# Patient Record
Sex: FEMALE | Race: WHITE | NOT HISPANIC OR LATINO
[De-identification: names, ages, dates, MRNs, and addresses within clinical notes are randomized per-mention and may not be internally consistent; named-entity substitution may affect disease eponyms.]

---

## 2017-01-19 ENCOUNTER — APPOINTMENT (OUTPATIENT)
Dept: PULMONOLOGY | Facility: CLINIC | Age: 57
End: 2017-01-19

## 2017-01-19 VITALS
HEIGHT: 67 IN | SYSTOLIC BLOOD PRESSURE: 108 MMHG | DIASTOLIC BLOOD PRESSURE: 68 MMHG | WEIGHT: 156 LBS | HEART RATE: 99 BPM | BODY MASS INDEX: 24.48 KG/M2 | OXYGEN SATURATION: 98 %

## 2017-01-19 DIAGNOSIS — J44.9 CHRONIC OBSTRUCTIVE PULMONARY DISEASE, UNSPECIFIED: ICD-10-CM

## 2017-03-17 ENCOUNTER — RX RENEWAL (OUTPATIENT)
Age: 57
End: 2017-03-17

## 2017-04-14 ENCOUNTER — RX RENEWAL (OUTPATIENT)
Age: 57
End: 2017-04-14

## 2017-04-20 ENCOUNTER — APPOINTMENT (OUTPATIENT)
Dept: PULMONOLOGY | Facility: CLINIC | Age: 57
End: 2017-04-20

## 2017-04-20 VITALS
DIASTOLIC BLOOD PRESSURE: 68 MMHG | HEIGHT: 67 IN | HEART RATE: 107 BPM | WEIGHT: 157 LBS | OXYGEN SATURATION: 98 % | SYSTOLIC BLOOD PRESSURE: 110 MMHG | BODY MASS INDEX: 24.64 KG/M2

## 2017-07-13 ENCOUNTER — RX RENEWAL (OUTPATIENT)
Age: 57
End: 2017-07-13

## 2017-07-20 ENCOUNTER — APPOINTMENT (OUTPATIENT)
Dept: PULMONOLOGY | Facility: CLINIC | Age: 57
End: 2017-07-20

## 2017-07-20 VITALS
DIASTOLIC BLOOD PRESSURE: 78 MMHG | RESPIRATION RATE: 16 BRPM | HEIGHT: 67 IN | BODY MASS INDEX: 25.58 KG/M2 | TEMPERATURE: 98.7 F | HEART RATE: 72 BPM | WEIGHT: 163 LBS | SYSTOLIC BLOOD PRESSURE: 144 MMHG | OXYGEN SATURATION: 99 %

## 2017-07-20 RX ORDER — ALBUTEROL SULFATE 90 UG/1
108 (90 BASE) AEROSOL, METERED RESPIRATORY (INHALATION) EVERY 6 HOURS
Qty: 1 | Refills: 5 | Status: ACTIVE | COMMUNITY
Start: 2017-07-20 | End: 1900-01-01

## 2017-07-24 ENCOUNTER — APPOINTMENT (OUTPATIENT)
Dept: ENDOCRINOLOGY | Facility: CLINIC | Age: 57
End: 2017-07-24

## 2017-07-24 VITALS — DIASTOLIC BLOOD PRESSURE: 80 MMHG | WEIGHT: 162 LBS | SYSTOLIC BLOOD PRESSURE: 132 MMHG | BODY MASS INDEX: 25.37 KG/M2

## 2017-07-24 VITALS — WEIGHT: 163 LBS | OXYGEN SATURATION: 98 % | HEIGHT: 67 IN | BODY MASS INDEX: 25.58 KG/M2 | HEART RATE: 75 BPM

## 2017-07-24 DIAGNOSIS — E55.9 VITAMIN D DEFICIENCY, UNSPECIFIED: ICD-10-CM

## 2017-07-24 DIAGNOSIS — E89.0 POSTPROCEDURAL HYPOTHYROIDISM: ICD-10-CM

## 2020-04-15 ENCOUNTER — EMERGENCY (EMERGENCY)
Facility: HOSPITAL | Age: 60
LOS: 1 days | Discharge: HOME | End: 2020-04-15
Attending: EMERGENCY MEDICINE
Payer: MEDICARE

## 2020-04-15 VITALS
HEIGHT: 66 IN | TEMPERATURE: 99 F | OXYGEN SATURATION: 100 % | WEIGHT: 119.93 LBS | SYSTOLIC BLOOD PRESSURE: 157 MMHG | DIASTOLIC BLOOD PRESSURE: 99 MMHG | HEART RATE: 88 BPM | RESPIRATION RATE: 20 BRPM

## 2020-04-15 VITALS
RESPIRATION RATE: 20 BRPM | TEMPERATURE: 98 F | DIASTOLIC BLOOD PRESSURE: 93 MMHG | SYSTOLIC BLOOD PRESSURE: 142 MMHG | HEART RATE: 90 BPM | OXYGEN SATURATION: 100 %

## 2020-04-15 PROCEDURE — 94640 AIRWAY INHALATION TREATMENT: CPT

## 2020-04-15 PROCEDURE — 99283 EMERGENCY DEPT VISIT LOW MDM: CPT | Mod: 25

## 2020-04-15 PROCEDURE — 71045 X-RAY EXAM CHEST 1 VIEW: CPT

## 2020-04-15 PROCEDURE — 71045 X-RAY EXAM CHEST 1 VIEW: CPT | Mod: 26

## 2020-04-15 PROCEDURE — 99284 EMERGENCY DEPT VISIT MOD MDM: CPT | Mod: GC

## 2020-04-15 RX ORDER — ALBUTEROL 90 UG/1
2 AEROSOL, METERED ORAL EVERY 4 HOURS
Refills: 0 | Status: DISCONTINUED | OUTPATIENT
Start: 2020-04-15 | End: 2020-04-19

## 2020-04-15 RX ORDER — LIDOCAINE 4 G/100G
1 CREAM TOPICAL ONCE
Refills: 0 | Status: COMPLETED | OUTPATIENT
Start: 2020-04-15 | End: 2020-04-15

## 2020-04-15 RX ORDER — TIOTROPIUM BROMIDE 18 UG/1
1 CAPSULE ORAL; RESPIRATORY (INHALATION) DAILY
Refills: 0 | Status: COMPLETED | OUTPATIENT
Start: 2020-04-15 | End: 2021-03-14

## 2020-04-15 RX ORDER — OXYCODONE HYDROCHLORIDE 5 MG/1
5 TABLET ORAL ONCE
Refills: 0 | Status: DISCONTINUED | OUTPATIENT
Start: 2020-04-15 | End: 2020-04-15

## 2020-04-15 RX ORDER — TIOTROPIUM BROMIDE 18 UG/1
1 CAPSULE ORAL; RESPIRATORY (INHALATION) DAILY
Refills: 0 | Status: DISCONTINUED | OUTPATIENT
Start: 2020-04-15 | End: 2020-04-19

## 2020-04-15 RX ORDER — OXYCODONE HYDROCHLORIDE 5 MG/1
1 TABLET ORAL
Qty: 4 | Refills: 0
Start: 2020-04-15 | End: 2020-04-16

## 2020-04-15 RX ORDER — ALBUTEROL 90 UG/1
2 AEROSOL, METERED ORAL EVERY 4 HOURS
Refills: 0 | Status: COMPLETED | OUTPATIENT
Start: 2020-04-15 | End: 2021-03-14

## 2020-04-15 RX ADMIN — OXYCODONE HYDROCHLORIDE 5 MILLIGRAM(S): 5 TABLET ORAL at 14:49

## 2020-04-15 RX ADMIN — Medication 50 MILLIGRAM(S): at 14:08

## 2020-04-15 RX ADMIN — TIOTROPIUM BROMIDE 1 CAPSULE(S): 18 CAPSULE ORAL; RESPIRATORY (INHALATION) at 14:39

## 2020-04-15 RX ADMIN — LIDOCAINE 1 PATCH: 4 CREAM TOPICAL at 14:39

## 2020-04-15 RX ADMIN — ALBUTEROL 2 PUFF(S): 90 AEROSOL, METERED ORAL at 14:08

## 2020-04-15 NOTE — ED PROVIDER NOTE - NSFOLLOWUPINSTRUCTIONS_ED_ALL_ED_FT
Your chest x-ray was significant for a nodule as well as lung tissue that was not seen in your previous chest x-ray done here. The radiology read of your findings are provided in your discharge packet. Please follow up with either Dr. Saeed, your previous pulmonologist or with a new pulmonologist here at our clinic (number provided) for further work up within the 1-2 months to assess for cancer.     Please see attached information on COPD.    Please return to the ED immediately for new cough, fevers, trouble breathing.     Please also see attached information on back pain.     Please follow up with spine center and return immediately for pain.

## 2020-04-15 NOTE — ED PROVIDER NOTE - OBJECTIVE STATEMENT
59F pmhx of COPD, lumbar radiculopathy present for abd pain/back pain/SOB for 1 month. Denies fevers, chills, nausea, vomiting, diarrhea, chest pain. Describes abd pain as generalized, no where specific. Denies changes in sensation to LE, urinary retention/incontinence, Still ambulating. States that symptoms have been worsening during the month, taking Tylenol daily for pain with no improvement leading to presentation today. Patient smokes about 5-6 cigarettes a day.

## 2020-04-15 NOTE — ED ADULT NURSE NOTE - NSIMPLEMENTINTERV_GEN_ALL_ED
Implemented All Universal Safety Interventions:  [place] to call system. Call bell, personal items and telephone within reach. Instruct patient to call for assistance. Room bathroom lighting operational. Non-slip footwear when patient is off stretcher. Physically safe environment: no spills, clutter or unnecessary equipment. Stretcher in lowest position, wheels locked, appropriate side rails in place.

## 2020-04-15 NOTE — ED PROVIDER NOTE - NS ED ROS FT
CONST: no fevers, no chills, no trauma  EYES: no pain, no visual disturbances  ENT: no sore throat, no epistaxis, no rhinorrhea, no hearing changes  CV: no chest pain, no palpitations, no orthopnea, no extremity pain or swelling  RESP: + shortness of breath, no cough, no sputum, no pleurisy, no wheezing  ABD: + abdominal pain, no nausea, no vomiting, no diarrhea, no black or bloody stool  : no dysuria, no hematuria, no frequency, no urgency  MSK: + back pain, no neck pain, no extremity pain  NEURO: no headache, no sensory disturbances, no focal weakness, no dizziness  HEME: no easy bleeding or bruising  SKIN: no diaphoresis, no rash

## 2020-04-15 NOTE — ED PROVIDER NOTE - PHYSICAL EXAMINATION
Const: Well-nourished, Well-developed, appearing stated age.  Eyes: PERRL, no conjunctival injection, and symmetrical lids.  HEENT: Head NCAT, no lesions. Atraumatic external nose and ears. Dry MM.  Neck: Symmetric, trachea midline.   CVS: +S1/S2, Peripheral pulses 2+ and equal in all extremities.  RESP: Mildly labored respiratory effort with pursed lip breathing. +wheezes b/l  GI: Nontender/Nondistended, No hepatosplenomegaly.  MSK: Normocephalic/Atraumatic, Extremities w/o deformity or ttp or edema.   Skin: Warm, dry and intact. No rashes or lesions.  Neuro: CNs II-XII grossly intact. Motor & Sensation intact in b/l LE.   Psych: Awake, Alert, & Oriented (AAO) x3. Appropriate mood and affect.

## 2020-04-15 NOTE — ED PROVIDER NOTE - CLINICAL SUMMARY MEDICAL DECISION MAKING FREE TEXT BOX
59F presenting with worsening back pain abd pain, SOB PE: VSS, mild wheezes b/l Ddx: Back pain likely 2/2 to known radiculopathy. No red flag symptoms and normal neuro exam, not concerning for cauda equina at this time. Does not seem to be in COPD exacerbation, normal O2 sat with only mild wheezes on exam Plan: albuterol, steroids, pain control.

## 2020-04-15 NOTE — ED PROVIDER NOTE - PATIENT PORTAL LINK FT
You can access the FollowMyHealth Patient Portal offered by [facility] by registering at the following website: [email or web address]. By joining [facility]’s FollowMyHealth portal, you will also be able to view your health information using other applications (apps) compatible with our system.

## 2020-04-15 NOTE — ED ADULT NURSE NOTE - OBJECTIVE STATEMENT
59 y.o. Female presents to the ED c/o SOB x1 week. A&Ox3. Hx COPD, HTN, HLD. Pt reports feeling difficulty breathing x1 week. Pt also c/o abd pain with mid and lower back pain x3-4months. Pt states "I can't breathe" and is tachypneic upon assessment with 100% O2 sat. Pt appears anxious. Abd soft and nondistended. As per patient, she has been taking prednisone 10mg last week and tylenol for back pain. Denies CP, N/V/D, urinary complications, fever/chills. Dr. Sheppard at bedside for assessment.

## 2020-04-15 NOTE — ED PROVIDER NOTE - ATTENDING CONTRIBUTION TO CARE
Dr Bobby Note: Dr Bobby Note: 60 yo F smoker, copd, and hx of "back pain"  in ED for back pain  Pt states she ran out of her pain meds  Pt moved out of state now back in [place] and has not established care   Pt states this is her typical lower back pain, non radiating, no numbness, tingling or weakness  No urinary or stool incontinence    Pt has mild SOB, her typical daily cough and sob, still smoking   Pt denies any fevers or chest pain  Pt taking OTC pain meds w no relief, last dose yesterday     Gen: no acute distress non toxic alert and coherent, no cyanosis   HEENT: atraumatic,  no scleral icterus  EOMI   Neck: no midline tenderness, supple  Lungs: no tachypnea, no retractions, mild scattered exp wheeze   CVS: reg HR S1/S2 no murmur no gallop   ABD: soft   Back: no midline tenderness, +b/l lower lumbar paraspinal tenderness   Extremities: No deformities, no edema, no calf tenderness  Neuro: AA and Ox3, CNII-XII grossly intact    Back pain- No neuro symptoms, plan for pain meds, re eval, discussed at length w patient she needs to establish outpatient  follow up, concerning symptoms needs to return for also discussed   Wheeze- pt current smoker, not hypoxic, no resp distress, low suspicion for infection has no fever, normal oxygen saturation for acute resp issue, likely from daily cigarettes and COPD, pt has inhaler at home\  re eval

## 2020-04-15 NOTE — ED PROVIDER NOTE - NSFOLLOWUPCLINICS_GEN_ALL_ED_FT
[facility] Pulmonolgy and Sleep Medicine  Pulmonology  [address] [place] [address]  Phone: [phone]  Fax:   Follow Up Time:

## 2020-04-29 ENCOUNTER — INPATIENT (INPATIENT)
Facility: HOSPITAL | Age: 60
LOS: 4 days | Discharge: HOME | DRG: 381 | End: 2020-05-04
Attending: COLON & RECTAL SURGERY | Admitting: COLON & RECTAL SURGERY
Payer: MEDICARE

## 2020-04-29 VITALS
SYSTOLIC BLOOD PRESSURE: 170 MMHG | RESPIRATION RATE: 40 BRPM | DIASTOLIC BLOOD PRESSURE: 116 MMHG | WEIGHT: 119.93 LBS | HEART RATE: 107 BPM | HEIGHT: 66 IN | TEMPERATURE: 99 F | OXYGEN SATURATION: 96 %

## 2020-04-29 DIAGNOSIS — R19.8 OTHER SPECIFIED SYMPTOMS AND SIGNS INVOLVING THE DIGESTIVE SYSTEM AND ABDOMEN: ICD-10-CM

## 2020-04-29 LAB
ALBUMIN SERPL ELPH-MCNC: 4.2 G/DL — SIGNIFICANT CHANGE UP (ref 3.3–5)
ALP SERPL-CCNC: 107 U/L — SIGNIFICANT CHANGE UP (ref 40–120)
ALT FLD-CCNC: 10 U/L — SIGNIFICANT CHANGE UP (ref 10–45)
ANION GAP SERPL CALC-SCNC: 16 MMOL/L — SIGNIFICANT CHANGE UP (ref 5–17)
APPEARANCE UR: ABNORMAL
AST SERPL-CCNC: 9 U/L — LOW (ref 10–40)
BACTERIA # UR AUTO: NEGATIVE — SIGNIFICANT CHANGE UP
BASE EXCESS BLDV CALC-SCNC: 1.5 MMOL/L — SIGNIFICANT CHANGE UP (ref -2–2)
BASOPHILS # BLD AUTO: 0.01 K/UL — SIGNIFICANT CHANGE UP (ref 0–0.2)
BASOPHILS NFR BLD AUTO: 0.1 % — SIGNIFICANT CHANGE UP (ref 0–2)
BILIRUB SERPL-MCNC: 0.1 MG/DL — LOW (ref 0.2–1.2)
BILIRUB UR-MCNC: NEGATIVE — SIGNIFICANT CHANGE UP
BUN SERPL-MCNC: 33 MG/DL — HIGH (ref 7–23)
CA-I SERPL-SCNC: 1.23 MMOL/L — SIGNIFICANT CHANGE UP (ref 1.12–1.3)
CALCIUM SERPL-MCNC: 9.7 MG/DL — SIGNIFICANT CHANGE UP (ref 8.4–10.5)
CHLORIDE BLDV-SCNC: 103 MMOL/L — SIGNIFICANT CHANGE UP (ref 96–108)
CHLORIDE SERPL-SCNC: 100 MMOL/L — SIGNIFICANT CHANGE UP (ref 96–108)
CO2 BLDV-SCNC: 28 MMOL/L — SIGNIFICANT CHANGE UP (ref 22–30)
CO2 SERPL-SCNC: 23 MMOL/L — SIGNIFICANT CHANGE UP (ref 22–31)
COLOR SPEC: YELLOW — SIGNIFICANT CHANGE UP
CREAT SERPL-MCNC: 1.07 MG/DL — SIGNIFICANT CHANGE UP (ref 0.5–1.3)
DIFF PNL FLD: NEGATIVE — SIGNIFICANT CHANGE UP
EOSINOPHIL # BLD AUTO: 0 K/UL — SIGNIFICANT CHANGE UP (ref 0–0.5)
EOSINOPHIL NFR BLD AUTO: 0 % — SIGNIFICANT CHANGE UP (ref 0–6)
EPI CELLS # UR: 5 /HPF — SIGNIFICANT CHANGE UP
GAS PNL BLDV: 138 MMOL/L — SIGNIFICANT CHANGE UP (ref 135–145)
GAS PNL BLDV: SIGNIFICANT CHANGE UP
GAS PNL BLDV: SIGNIFICANT CHANGE UP
GLUCOSE BLDV-MCNC: 166 MG/DL — HIGH (ref 70–99)
GLUCOSE SERPL-MCNC: 177 MG/DL — HIGH (ref 70–99)
GLUCOSE UR QL: ABNORMAL
HCO3 BLDV-SCNC: 26 MMOL/L — SIGNIFICANT CHANGE UP (ref 21–29)
HCT VFR BLD CALC: 33.7 % — LOW (ref 34.5–45)
HCT VFR BLDA CALC: 35 % — LOW (ref 39–50)
HGB BLD CALC-MCNC: 11.3 G/DL — LOW (ref 11.5–15.5)
HGB BLD-MCNC: 11 G/DL — LOW (ref 11.5–15.5)
HYALINE CASTS # UR AUTO: 5 /LPF — SIGNIFICANT CHANGE UP (ref 0–7)
IMM GRANULOCYTES NFR BLD AUTO: 0.6 % — SIGNIFICANT CHANGE UP (ref 0–1.5)
KETONES UR-MCNC: NEGATIVE — SIGNIFICANT CHANGE UP
LACTATE BLDV-MCNC: 2.3 MMOL/L — HIGH (ref 0.7–2)
LEUKOCYTE ESTERASE UR-ACNC: ABNORMAL
LIDOCAIN IGE QN: 89 U/L — HIGH (ref 7–60)
LYMPHOCYTES # BLD AUTO: 16.9 % — SIGNIFICANT CHANGE UP (ref 13–44)
LYMPHOCYTES # BLD AUTO: 2.24 K/UL — SIGNIFICANT CHANGE UP (ref 1–3.3)
MCHC RBC-ENTMCNC: 29.6 PG — SIGNIFICANT CHANGE UP (ref 27–34)
MCHC RBC-ENTMCNC: 32.6 GM/DL — SIGNIFICANT CHANGE UP (ref 32–36)
MCV RBC AUTO: 90.8 FL — SIGNIFICANT CHANGE UP (ref 80–100)
MONOCYTES # BLD AUTO: 1.17 K/UL — HIGH (ref 0–0.9)
MONOCYTES NFR BLD AUTO: 8.8 % — SIGNIFICANT CHANGE UP (ref 2–14)
NEUTROPHILS # BLD AUTO: 9.75 K/UL — HIGH (ref 1.8–7.4)
NEUTROPHILS NFR BLD AUTO: 73.6 % — SIGNIFICANT CHANGE UP (ref 43–77)
NITRITE UR-MCNC: NEGATIVE — SIGNIFICANT CHANGE UP
NRBC # BLD: 0 /100 WBCS — SIGNIFICANT CHANGE UP (ref 0–0)
PCO2 BLDV: 44 MMHG — SIGNIFICANT CHANGE UP (ref 35–50)
PH BLDV: 7.39 — SIGNIFICANT CHANGE UP (ref 7.35–7.45)
PH UR: 5.5 — SIGNIFICANT CHANGE UP (ref 5–8)
PLATELET # BLD AUTO: 807 K/UL — HIGH (ref 150–400)
PO2 BLDV: 35 MMHG — SIGNIFICANT CHANGE UP (ref 25–45)
POTASSIUM BLDV-SCNC: 3.5 MMOL/L — SIGNIFICANT CHANGE UP (ref 3.5–5.3)
POTASSIUM SERPL-MCNC: 3.5 MMOL/L — SIGNIFICANT CHANGE UP (ref 3.5–5.3)
POTASSIUM SERPL-SCNC: 3.5 MMOL/L — SIGNIFICANT CHANGE UP (ref 3.5–5.3)
PROT SERPL-MCNC: 7.5 G/DL — SIGNIFICANT CHANGE UP (ref 6–8.3)
PROT UR-MCNC: ABNORMAL
RBC # BLD: 3.71 M/UL — LOW (ref 3.8–5.2)
RBC # FLD: 13.2 % — SIGNIFICANT CHANGE UP (ref 10.3–14.5)
RBC CASTS # UR COMP ASSIST: 1 /HPF — SIGNIFICANT CHANGE UP (ref 0–4)
SAO2 % BLDV: 62 % — LOW (ref 67–88)
SARS-COV-2 RNA SPEC QL NAA+PROBE: SIGNIFICANT CHANGE UP
SODIUM SERPL-SCNC: 139 MMOL/L — SIGNIFICANT CHANGE UP (ref 135–145)
SP GR SPEC: 1.03 — HIGH (ref 1.01–1.02)
UROBILINOGEN FLD QL: NEGATIVE — SIGNIFICANT CHANGE UP
WBC # BLD: 13.25 K/UL — HIGH (ref 3.8–10.5)
WBC # FLD AUTO: 13.25 K/UL — HIGH (ref 3.8–10.5)
WBC UR QL: 4 /HPF — SIGNIFICANT CHANGE UP (ref 0–5)

## 2020-04-29 PROCEDURE — 71045 X-RAY EXAM CHEST 1 VIEW: CPT | Mod: 26

## 2020-04-29 PROCEDURE — 99285 EMERGENCY DEPT VISIT HI MDM: CPT | Mod: CS,GC

## 2020-04-29 PROCEDURE — 74177 CT ABD & PELVIS W/CONTRAST: CPT | Mod: 26

## 2020-04-29 RX ORDER — TIOTROPIUM BROMIDE 18 UG/1
1 CAPSULE ORAL; RESPIRATORY (INHALATION) DAILY
Refills: 0 | Status: DISCONTINUED | OUTPATIENT
Start: 2020-04-29 | End: 2020-05-04

## 2020-04-29 RX ORDER — TIOTROPIUM BROMIDE AND OLODATEROL 3.124; 2.736 UG/1; UG/1
2 SPRAY, METERED RESPIRATORY (INHALATION) ONCE
Refills: 0 | Status: COMPLETED | OUTPATIENT
Start: 2020-04-29 | End: 2020-04-29

## 2020-04-29 RX ORDER — ALBUTEROL 90 UG/1
2 AEROSOL, METERED ORAL EVERY 6 HOURS
Refills: 0 | Status: DISCONTINUED | OUTPATIENT
Start: 2020-04-29 | End: 2020-05-04

## 2020-04-29 RX ORDER — FLUCONAZOLE 150 MG/1
TABLET ORAL
Refills: 0 | Status: DISCONTINUED | OUTPATIENT
Start: 2020-04-29 | End: 2020-05-04

## 2020-04-29 RX ORDER — ENOXAPARIN SODIUM 100 MG/ML
40 INJECTION SUBCUTANEOUS ONCE
Refills: 0 | Status: DISCONTINUED | OUTPATIENT
Start: 2020-04-29 | End: 2020-05-01

## 2020-04-29 RX ORDER — SODIUM CHLORIDE 9 MG/ML
1000 INJECTION INTRAMUSCULAR; INTRAVENOUS; SUBCUTANEOUS ONCE
Refills: 0 | Status: COMPLETED | OUTPATIENT
Start: 2020-04-29 | End: 2020-04-29

## 2020-04-29 RX ORDER — BUDESONIDE AND FORMOTEROL FUMARATE DIHYDRATE 160; 4.5 UG/1; UG/1
2 AEROSOL RESPIRATORY (INHALATION)
Refills: 0 | Status: DISCONTINUED | OUTPATIENT
Start: 2020-04-29 | End: 2020-05-04

## 2020-04-29 RX ORDER — MORPHINE SULFATE 50 MG/1
4 CAPSULE, EXTENDED RELEASE ORAL ONCE
Refills: 0 | Status: DISCONTINUED | OUTPATIENT
Start: 2020-04-29 | End: 2020-04-29

## 2020-04-29 RX ORDER — ALBUTEROL 90 UG/1
4 AEROSOL, METERED ORAL ONCE
Refills: 0 | Status: COMPLETED | OUTPATIENT
Start: 2020-04-29 | End: 2020-04-29

## 2020-04-29 RX ORDER — FLUCONAZOLE 150 MG/1
400 TABLET ORAL ONCE
Refills: 0 | Status: COMPLETED | OUTPATIENT
Start: 2020-04-29 | End: 2020-04-29

## 2020-04-29 RX ORDER — PIPERACILLIN AND TAZOBACTAM 4; .5 G/20ML; G/20ML
3.38 INJECTION, POWDER, LYOPHILIZED, FOR SOLUTION INTRAVENOUS ONCE
Refills: 0 | Status: COMPLETED | OUTPATIENT
Start: 2020-04-29 | End: 2020-04-29

## 2020-04-29 RX ORDER — SODIUM CHLORIDE 9 MG/ML
1000 INJECTION INTRAMUSCULAR; INTRAVENOUS; SUBCUTANEOUS
Refills: 0 | Status: DISCONTINUED | OUTPATIENT
Start: 2020-04-29 | End: 2020-04-30

## 2020-04-29 RX ORDER — FLUCONAZOLE 150 MG/1
400 TABLET ORAL EVERY 24 HOURS
Refills: 0 | Status: DISCONTINUED | OUTPATIENT
Start: 2020-04-30 | End: 2020-05-04

## 2020-04-29 RX ADMIN — SODIUM CHLORIDE 1000 MILLILITER(S): 9 INJECTION INTRAMUSCULAR; INTRAVENOUS; SUBCUTANEOUS at 20:07

## 2020-04-29 RX ADMIN — TIOTROPIUM BROMIDE AND OLODATEROL 2 PUFF(S): 3.124; 2.736 SPRAY, METERED RESPIRATORY (INHALATION) at 17:28

## 2020-04-29 RX ADMIN — MORPHINE SULFATE 4 MILLIGRAM(S): 50 CAPSULE, EXTENDED RELEASE ORAL at 23:12

## 2020-04-29 RX ADMIN — PIPERACILLIN AND TAZOBACTAM 200 GRAM(S): 4; .5 INJECTION, POWDER, LYOPHILIZED, FOR SOLUTION INTRAVENOUS at 23:12

## 2020-04-29 RX ADMIN — Medication 125 MILLIGRAM(S): at 16:15

## 2020-04-29 RX ADMIN — ALBUTEROL 4 PUFF(S): 90 AEROSOL, METERED ORAL at 16:15

## 2020-04-29 RX ADMIN — MORPHINE SULFATE 4 MILLIGRAM(S): 50 CAPSULE, EXTENDED RELEASE ORAL at 18:44

## 2020-04-29 RX ADMIN — MORPHINE SULFATE 4 MILLIGRAM(S): 50 CAPSULE, EXTENDED RELEASE ORAL at 15:59

## 2020-04-29 NOTE — ED PROVIDER NOTE - CLINICAL SUMMARY MEDICAL DECISION MAKING FREE TEXT BOX
60F hx of COPD current daily smoker of 5-7 cigarettes presenting for evaluation of shortness of breath, states that she cannot breath, also complaining of severe abdominal pain, states it is a 10/10, on exam without significant findings other than wheezing in all quadrants, will check labs, cxr, ct ap, treat pain with morphine, breathing with COPD cocktail, follow up studies, reassess, dispo.

## 2020-04-29 NOTE — H&P ADULT - NSICDXPASTMEDICALHX_GEN_ALL_CORE_FT
PAST MEDICAL HISTORY:  COPD (chronic obstructive pulmonary disease)     HTN (hypertension)     Hyperthyroidism

## 2020-04-29 NOTE — ED ADULT NURSE REASSESSMENT NOTE - NS ED NURSE REASSESS COMMENT FT1
Report received from GHASSAN Montez. Patient c/o abdominal pain but refusing CT scan. MD Meredith aware. Fluids initiated. Patient pending disposition.

## 2020-04-29 NOTE — ED ADULT TRIAGE NOTE - CHIEF COMPLAINT QUOTE
"i'm end stage COPD" states shortness of breath, abdominal pain, diarrhea - worsening over past few weeks. denies sick contacts or being tested for covid.

## 2020-04-29 NOTE — ED PROVIDER NOTE - ATTENDING CONTRIBUTION TO CARE
60y F hx of COPD, not on home oxygen here c/o SOB, abd pain. Pt states sx have been ongoing for at least last 7 days. SOB constant, no change with position or exertion. No leg swelling. Sat 100% RA. Mild tachypnea and scattered wheezes throughout BL lung fields. No EL edema. No cough or fever. Still smokes 5-7 cigarettes per day. Also notes abd pain, R sided cannot discern whether greater in RUQ or RLQ. No grr. Has diarrhea, watery brown, non bloody for past 3 days. Seen at OSH few days ago for same complaints, still with adhesive in R axilla, states "they did nothing for her." COPD exac vs PNA. Possibly covid. Do not suspect PE. No leg swelling or hypoxia. Check EKG, CXR, albuterol, steroids, re-eval. CTAP to eval for intra-abd infection given abd pain and diarrhea.

## 2020-04-29 NOTE — H&P ADULT - NSHPLABSRESULTS_GEN_ALL_CORE
CBC Full  -  ( 2020 16:20 )  WBC Count : 13.25 K/uL  RBC Count : 3.71 M/uL  Hemoglobin : 11.0 g/dL  Hematocrit : 33.7 %  Platelet Count - Automated : 807 K/uL  Mean Cell Volume : 90.8 fl  Mean Cell Hemoglobin : 29.6 pg  Mean Cell Hemoglobin Concentration : 32.6 gm/dL  Auto Neutrophil # : 9.75 K/uL  Auto Lymphocyte # : 2.24 K/uL  Auto Monocyte # : 1.17 K/uL  Auto Eosinophil # : 0.00 K/uL  Auto Basophil # : 0.01 K/uL  Auto Neutrophil % : 73.6 %  Auto Lymphocyte % : 16.9 %  Auto Monocyte % : 8.8 %  Auto Eosinophil % : 0.0 %  Auto Basophil % : 0.1 %        139  |  100  |  33<H>  ----------------------------<  177<H>  3.5   |  23  |  1.07    Ca    9.7      2020 16:20    TPro  7.5  /  Alb  4.2  /  TBili  0.1<L>  /  DBili  x   /  AST  9<L>  /  ALT  10  /  AlkPhos  107      LIVER FUNCTIONS - ( 2020 16:20 )  Alb: 4.2 g/dL / Pro: 7.5 g/dL / ALK PHOS: 107 U/L / ALT: 10 U/L / AST: 9 U/L / GGT: x           CAPILLARY BLOOD GLUCOSE        Urinalysis Basic - ( 2020 17:18 )    Color: Yellow / Appearance: Slightly Turbid / S.031 / pH: x  Gluc: x / Ketone: Negative  / Bili: Negative / Urobili: Negative   Blood: x / Protein: 30 mg/dL / Nitrite: Negative   Leuk Esterase: Small / RBC: 1 /hpf / WBC 4 /HPF   Sq Epi: x / Non Sq Epi: 5 /hpf / Bacteria: Negative

## 2020-04-29 NOTE — H&P ADULT - ASSESSMENT
60F with COPD on steroids, on longterm mesalamine (likely IBD undiagnosed), current smoker, presents with abdominal pain 'for months' found to have small droplets of air in the wall of the antrum, likely 2/2 ulcer.     -admit to green surgery, Dr. Andrade  -NPO/IVF  -IV abx  -serial abdominal exams before pain medication    page [phone] with surgical questions  Shiela Wright, PGY-4

## 2020-04-29 NOTE — ED PROVIDER NOTE - OBJECTIVE STATEMENT
Hx of COPD, presenting for evaluation of shortness of breath, states that she cannot breath, has been feeling short of breath for the past 7 days. Also complaining of right lower quadrant pain, has been having this and states that it is a 10/10 pain, sharp, no dysuria, no nausea or vomiting, +diarrhea no constipation, denies bloody stools.

## 2020-04-29 NOTE — ED ADULT NURSE REASSESSMENT NOTE - NS ED NURSE REASSESS COMMENT FT1
patient resting on stretcher. patient pending CT scan.  will continue to monitor. patient comfort and safety provided.

## 2020-04-29 NOTE — ED PROVIDER NOTE - PHYSICAL EXAMINATION
Gen: NAD, non-toxic, conversational  Eyes: PERRL, EOMI   HENT: Normocephalic, atraumatic. External ears normal, no rhinorrhea, moist mucous membranes.   CV: RRR, no M/R/G  Resp: wheezing in all lung fields, non-labored, speaking without difficulty on room air with 100% saturation on room air   Abd: soft, non tender, non rigid, no guarding or rebound tenderness  Back: No CVAT bilaterally, no midline ttp  Skin: dry, wwp   Neuro: AOx3, speech is fluent and appropriate  Psych: Mood okay, affect euthymic Gen: NAD, non-toxic, conversational, agitated  Eyes: PERRL, EOMI   HENT: Normocephalic, atraumatic. External ears normal, no rhinorrhea, moist mucous membranes.   CV: RRR, no M/R/G  Resp: wheezing in all lung fields, non-labored, speaking without difficulty on room air with 100% saturation on room air   Abd: soft, non tender, non rigid, no guarding or rebound tenderness  Back: No CVAT bilaterally, no midline ttp  Skin: dry, wwp   Neuro: AOx3, speech is fluent and appropriate  Psych: Mood concerned, affect hyperthymic

## 2020-04-29 NOTE — ED ADULT NURSE NOTE - OBJECTIVE STATEMENT
patient is a 60 year old female PMH of COPD who presents to the ED from home complaining of SOB. Patient states "i'm end stage COPD". patient is also complaining of abdominal pain, diarrhea. patient states she has been experiencing shortness of breath "for weeks" and today it has begun to get worse. patient is aaox3, lungs wheezing bilaterally, abd soft, nondistended, nontender, cap refill <3, radial pulses +2 bilaterally. patient denies chest pain, ha, dizziness, weakness, numbness or tingling, abd pain, back pain, changes in bowel or bladder, hematuria, bloody stool. upon arrival patient is tachypneic, agitated, anxious, patient 92% on room air. patient sinus tachycardia on monitor.  patient comfort and safety provided. VSS. will continue to monitor.

## 2020-04-29 NOTE — ED ADULT NURSE REASSESSMENT NOTE - NS ED NURSE REASSESS COMMENT FT1
RN to pharmacy to send down medication for patient. will continue to monitor. patient comfort and safety provided.

## 2020-04-29 NOTE — H&P ADULT - NSHPPHYSICALEXAM_GEN_ALL_CORE
General: WN/WD NAD  Neurology: A&Ox3, nonfocal, JOE x 4  Head:  Normocephalic, atraumatic  ENT:  Mucosa moist, no ulcerations  Neck:  Supple, no sinuses or palpable masses  Lymphatic:  No palpable cervical, supraclavicular, axillary or inguinal adenopathy  Respiratory: CTA B/L  CV: RRR, S1S2, no murmur  Abdominal: Soft, right upper quadrant and epigastric tenderness, no rebound or guarding, not peritoneal  MSK: No edema, + peripheral pulses, FROM all 4 extremity

## 2020-04-29 NOTE — H&P ADULT - HISTORY OF PRESENT ILLNESS
60F with COPD on steroids, on longterm mesalamine (likely IBD undiagnosed), current smoker, presents with abdominal pain 'for months'. As per patient, she has felt this epigastric and RUQ pain for months, but has been gradually worsening. Pain associated with diarrhea (improved now), but without nausea/vomiting/fevers/ chills. Patient has been able to tolerate a diet and has increased her protein intake lately.  She endorses having three colonoscopies in the past, all normal, most recent a couple of years ago, and has never had an endoscopy. Also says she usually takes prednisone 40mg daily, but ran out of that a few days ago. Currently smokes 5 cigarettes per day.   In the ED, patient tachycardic. Exam with tenderness in the right upper quadrant and epigastrium, no rebound or guarding, not peritoneal. WBC 14, lactate 2.3. CT scan showing two small droplets of air in the wall of the antrum, likely ulcer.

## 2020-04-30 LAB
ANION GAP SERPL CALC-SCNC: 13 MMOL/L — SIGNIFICANT CHANGE UP (ref 5–17)
ANION GAP SERPL CALC-SCNC: 14 MMOL/L — SIGNIFICANT CHANGE UP (ref 5–17)
BUN SERPL-MCNC: 27 MG/DL — HIGH (ref 7–23)
BUN SERPL-MCNC: 28 MG/DL — HIGH (ref 7–23)
CALCIUM SERPL-MCNC: 8.6 MG/DL — SIGNIFICANT CHANGE UP (ref 8.4–10.5)
CALCIUM SERPL-MCNC: 8.9 MG/DL — SIGNIFICANT CHANGE UP (ref 8.4–10.5)
CHLORIDE SERPL-SCNC: 100 MMOL/L — SIGNIFICANT CHANGE UP (ref 96–108)
CHLORIDE SERPL-SCNC: 101 MMOL/L — SIGNIFICANT CHANGE UP (ref 96–108)
CO2 SERPL-SCNC: 22 MMOL/L — SIGNIFICANT CHANGE UP (ref 22–31)
CO2 SERPL-SCNC: 23 MMOL/L — SIGNIFICANT CHANGE UP (ref 22–31)
CREAT SERPL-MCNC: 0.94 MG/DL — SIGNIFICANT CHANGE UP (ref 0.5–1.3)
CREAT SERPL-MCNC: 1.02 MG/DL — SIGNIFICANT CHANGE UP (ref 0.5–1.3)
GLUCOSE SERPL-MCNC: 140 MG/DL — HIGH (ref 70–99)
GLUCOSE SERPL-MCNC: 198 MG/DL — HIGH (ref 70–99)
H PYLORI AB SER-ACNC: <5 UNITS — SIGNIFICANT CHANGE UP
HCT VFR BLD CALC: 30.7 % — LOW (ref 34.5–45)
HCT VFR BLD CALC: 31.3 % — LOW (ref 34.5–45)
HCV AB S/CO SERPL IA: 5.02 S/CO — HIGH (ref 0–0.99)
HCV AB SERPL-IMP: REACTIVE
HGB BLD-MCNC: 9.7 G/DL — LOW (ref 11.5–15.5)
HGB BLD-MCNC: 9.7 G/DL — LOW (ref 11.5–15.5)
LACTATE SERPL-SCNC: 1.2 MMOL/L — SIGNIFICANT CHANGE UP (ref 0.7–2)
MAGNESIUM SERPL-MCNC: 2.5 MG/DL — SIGNIFICANT CHANGE UP (ref 1.6–2.6)
MAGNESIUM SERPL-MCNC: 2.5 MG/DL — SIGNIFICANT CHANGE UP (ref 1.6–2.6)
MCHC RBC-ENTMCNC: 29.6 PG — SIGNIFICANT CHANGE UP (ref 27–34)
MCHC RBC-ENTMCNC: 29.7 PG — SIGNIFICANT CHANGE UP (ref 27–34)
MCHC RBC-ENTMCNC: 31 GM/DL — LOW (ref 32–36)
MCHC RBC-ENTMCNC: 31.6 GM/DL — LOW (ref 32–36)
MCV RBC AUTO: 93.9 FL — SIGNIFICANT CHANGE UP (ref 80–100)
MCV RBC AUTO: 95.4 FL — SIGNIFICANT CHANGE UP (ref 80–100)
NRBC # BLD: 0 /100 WBCS — SIGNIFICANT CHANGE UP (ref 0–0)
NRBC # BLD: 0 /100 WBCS — SIGNIFICANT CHANGE UP (ref 0–0)
PHOSPHATE SERPL-MCNC: 2.8 MG/DL — SIGNIFICANT CHANGE UP (ref 2.5–4.5)
PHOSPHATE SERPL-MCNC: 3.3 MG/DL — SIGNIFICANT CHANGE UP (ref 2.5–4.5)
PLATELET # BLD AUTO: 684 K/UL — HIGH (ref 150–400)
PLATELET # BLD AUTO: 711 K/UL — HIGH (ref 150–400)
POTASSIUM SERPL-MCNC: 4.3 MMOL/L — SIGNIFICANT CHANGE UP (ref 3.5–5.3)
POTASSIUM SERPL-MCNC: 4.6 MMOL/L — SIGNIFICANT CHANGE UP (ref 3.5–5.3)
POTASSIUM SERPL-SCNC: 4.3 MMOL/L — SIGNIFICANT CHANGE UP (ref 3.5–5.3)
POTASSIUM SERPL-SCNC: 4.6 MMOL/L — SIGNIFICANT CHANGE UP (ref 3.5–5.3)
RBC # BLD: 3.27 M/UL — LOW (ref 3.8–5.2)
RBC # BLD: 3.28 M/UL — LOW (ref 3.8–5.2)
RBC # FLD: 13.3 % — SIGNIFICANT CHANGE UP (ref 10.3–14.5)
RBC # FLD: 13.3 % — SIGNIFICANT CHANGE UP (ref 10.3–14.5)
SODIUM SERPL-SCNC: 136 MMOL/L — SIGNIFICANT CHANGE UP (ref 135–145)
SODIUM SERPL-SCNC: 137 MMOL/L — SIGNIFICANT CHANGE UP (ref 135–145)
WBC # BLD: 7.98 K/UL — SIGNIFICANT CHANGE UP (ref 3.8–10.5)
WBC # BLD: 9.88 K/UL — SIGNIFICANT CHANGE UP (ref 3.8–10.5)
WBC # FLD AUTO: 7.98 K/UL — SIGNIFICANT CHANGE UP (ref 3.8–10.5)
WBC # FLD AUTO: 9.88 K/UL — SIGNIFICANT CHANGE UP (ref 3.8–10.5)

## 2020-04-30 RX ORDER — SUCRALFATE 1 G
1 TABLET ORAL EVERY 6 HOURS
Refills: 0 | Status: DISCONTINUED | OUTPATIENT
Start: 2020-04-30 | End: 2020-05-01

## 2020-04-30 RX ORDER — HYDROMORPHONE HYDROCHLORIDE 2 MG/ML
0.25 INJECTION INTRAMUSCULAR; INTRAVENOUS; SUBCUTANEOUS ONCE
Refills: 0 | Status: DISCONTINUED | OUTPATIENT
Start: 2020-04-30 | End: 2020-04-30

## 2020-04-30 RX ORDER — PANTOPRAZOLE SODIUM 20 MG/1
40 TABLET, DELAYED RELEASE ORAL
Refills: 0 | Status: DISCONTINUED | OUTPATIENT
Start: 2020-04-30 | End: 2020-05-01

## 2020-04-30 RX ORDER — SODIUM CHLORIDE 9 MG/ML
1000 INJECTION, SOLUTION INTRAVENOUS
Refills: 0 | Status: DISCONTINUED | OUTPATIENT
Start: 2020-04-30 | End: 2020-05-02

## 2020-04-30 RX ORDER — PIPERACILLIN AND TAZOBACTAM 4; .5 G/20ML; G/20ML
3.38 INJECTION, POWDER, LYOPHILIZED, FOR SOLUTION INTRAVENOUS EVERY 8 HOURS
Refills: 0 | Status: DISCONTINUED | OUTPATIENT
Start: 2020-04-30 | End: 2020-05-04

## 2020-04-30 RX ORDER — PANTOPRAZOLE SODIUM 20 MG/1
40 TABLET, DELAYED RELEASE ORAL DAILY
Refills: 0 | Status: DISCONTINUED | OUTPATIENT
Start: 2020-04-30 | End: 2020-04-30

## 2020-04-30 RX ORDER — MORPHINE SULFATE 50 MG/1
4 CAPSULE, EXTENDED RELEASE ORAL ONCE
Refills: 0 | Status: DISCONTINUED | OUTPATIENT
Start: 2020-04-30 | End: 2020-04-30

## 2020-04-30 RX ORDER — ACETAMINOPHEN 500 MG
1000 TABLET ORAL EVERY 6 HOURS
Refills: 0 | Status: COMPLETED | OUTPATIENT
Start: 2020-04-30 | End: 2020-05-01

## 2020-04-30 RX ADMIN — HYDROMORPHONE HYDROCHLORIDE 0.25 MILLIGRAM(S): 2 INJECTION INTRAMUSCULAR; INTRAVENOUS; SUBCUTANEOUS at 10:43

## 2020-04-30 RX ADMIN — FLUCONAZOLE 100 MILLIGRAM(S): 150 TABLET ORAL at 00:22

## 2020-04-30 RX ADMIN — Medication 1 GRAM(S): at 21:04

## 2020-04-30 RX ADMIN — ALBUTEROL 2 PUFF(S): 90 AEROSOL, METERED ORAL at 21:36

## 2020-04-30 RX ADMIN — HYDROMORPHONE HYDROCHLORIDE 0.25 MILLIGRAM(S): 2 INJECTION INTRAMUSCULAR; INTRAVENOUS; SUBCUTANEOUS at 15:42

## 2020-04-30 RX ADMIN — PANTOPRAZOLE SODIUM 40 MILLIGRAM(S): 20 TABLET, DELAYED RELEASE ORAL at 17:10

## 2020-04-30 RX ADMIN — Medication 400 MILLIGRAM(S): at 00:39

## 2020-04-30 RX ADMIN — MORPHINE SULFATE 4 MILLIGRAM(S): 50 CAPSULE, EXTENDED RELEASE ORAL at 02:50

## 2020-04-30 RX ADMIN — PIPERACILLIN AND TAZOBACTAM 25 GRAM(S): 4; .5 INJECTION, POWDER, LYOPHILIZED, FOR SOLUTION INTRAVENOUS at 06:32

## 2020-04-30 RX ADMIN — SODIUM CHLORIDE 75 MILLILITER(S): 9 INJECTION, SOLUTION INTRAVENOUS at 17:16

## 2020-04-30 RX ADMIN — BUDESONIDE AND FORMOTEROL FUMARATE DIHYDRATE 2 PUFF(S): 160; 4.5 AEROSOL RESPIRATORY (INHALATION) at 17:10

## 2020-04-30 RX ADMIN — SODIUM CHLORIDE 100 MILLILITER(S): 9 INJECTION INTRAMUSCULAR; INTRAVENOUS; SUBCUTANEOUS at 02:25

## 2020-04-30 RX ADMIN — PIPERACILLIN AND TAZOBACTAM 25 GRAM(S): 4; .5 INJECTION, POWDER, LYOPHILIZED, FOR SOLUTION INTRAVENOUS at 13:42

## 2020-04-30 RX ADMIN — BUDESONIDE AND FORMOTEROL FUMARATE DIHYDRATE 2 PUFF(S): 160; 4.5 AEROSOL RESPIRATORY (INHALATION) at 05:02

## 2020-04-30 RX ADMIN — TIOTROPIUM BROMIDE 1 CAPSULE(S): 18 CAPSULE ORAL; RESPIRATORY (INHALATION) at 13:42

## 2020-04-30 RX ADMIN — Medication 101.6 MILLIGRAM(S): at 05:02

## 2020-04-30 RX ADMIN — PIPERACILLIN AND TAZOBACTAM 25 GRAM(S): 4; .5 INJECTION, POWDER, LYOPHILIZED, FOR SOLUTION INTRAVENOUS at 21:04

## 2020-04-30 RX ADMIN — PANTOPRAZOLE SODIUM 40 MILLIGRAM(S): 20 TABLET, DELAYED RELEASE ORAL at 00:23

## 2020-04-30 NOTE — PROVIDER CONTACT NOTE (OTHER) - BACKGROUND
60 y F, c/o abdominal pain.  CTA shows small perforation in ABD. HX of HTN, COPD.  Pt was given morphine 4mg IVP x2. 60 y F, c/o abdominal pain.  CTA shows small perforation in ABD. HX of HTN, COPD.  Pt was given morphine 4mg IVP x2 in ED.

## 2020-04-30 NOTE — PROGRESS NOTE ADULT - SUBJECTIVE AND OBJECTIVE BOX
Interval events: Admitted with gastric ulcer and gastritis, imaging notable for focus of air within wall of gastric antrum. WBC improved from 13.2 to 9.8, and lactate from 2.3 to 1.2 with 1L fluid resuscitation. Re-examined overnight; continues to c/o abdominal pain and tender on exam, though soft, no peritonitis.    S: Patient continues to c/o epigastric abdominal pain, poorly controlled with pain medications. Remains NPO with no nausea/vomiting.    O: Vital Signs  T(C): 36.9 (04-29 @ 20:10), Max: 37.1 (04-29 @ 14:51)  HR: 78 (04-29 @ 23:10) (77 - 107)  BP: 166/97 (04-29 @ 23:10) (140/91 - 170/116)  RR: 20 (04-29 @ 23:10) (20 - 40)  SpO2: 99% (04-29 @ 23:10) (96% - 100%)    General: alert and oriented, NAD  Resp: airway patent, respirations unlabored  CVS: regular rate and rhythm  Abdomen: soft, +TTP in epigastrium, nondistended, no rebound/guarding  Extremities: no edema  Skin: warm, dry, appropriate color                          9.7    9.88  )-----------( 684      ( 30 Apr 2020 00:13 )             30.7   04-30    136  |  100  |  27<H>  ----------------------------<  198<H>  4.6   |  23  |  0.94    Ca    8.6      30 Apr 2020 00:13  Phos  2.8     04-30  Mg     2.5     04-30    TPro  7.5  /  Alb  4.2  /  TBili  0.1<L>  /  DBili  x   /  AST  9<L>  /  ALT  10  /  AlkPhos  107  04-29

## 2020-04-30 NOTE — PROVIDER CONTACT NOTE (OTHER) - RECOMMENDATIONS
Pt refusing to fill out form or go for MRI. Pt states she is not here for that problem and does not want to go. Pt educated about fx-still refusing

## 2020-04-30 NOTE — CHART NOTE - NSCHARTNOTEFT_GEN_A_CORE
Interval Events:  - C/o severe abdominal pain. Describes as similar to AM pain episodes.  - Refusing IV tylenol, states makes pain worse    O: Vital Signs  T(C): 36.5 (04-30 @ 09:48), Max: 37.1 (04-29 @ 16:26)  HR: 77 (04-30 @ 09:48) (62 - 100)  BP: 164/96 (04-30 @ 09:48) (140/91 - 166/97)  RR: 18 (04-30 @ 09:48) (18 - 25)  SpO2: 96% (04-30 @ 09:48) (96% - 100%)    Physical Exam:  General: NAD  Resp: respirations unlabored  Abdomen: soft, epigastric fullness and tenderness to palpation                          9.7    7.98  )-----------( 711      ( 30 Apr 2020 06:23 )             31.3   04-30    137  |  101  |  28<H>  ----------------------------<  140<H>  4.3   |  22  |  1.02    Ca    8.9      30 Apr 2020 06:23  Phos  3.3     04-30  Mg     2.5     04-30    TPro  7.5  /  Alb  4.2  /  TBili  0.1<L>  /  DBili  x   /  AST  9<L>  /  ALT  10  /  AlkPhos  107  04-29      Plan:  - 0.25mg IV dilaudid now

## 2020-04-30 NOTE — PROVIDER CONTACT NOTE (OTHER) - ASSESSMENT
Pt A&Ox4, VSS.  No signs of distress.  Pt c/o abdominal pain 9/10.  Pt remains NPO.  Requesting pain meds at this time.

## 2020-04-30 NOTE — PROGRESS NOTE ADULT - ASSESSMENT
60F with COPD on steroids, on longterm mesalamine (likely IBD undiagnosed), current smoker, presents with gastric ulcer with imaging concerning for small focus of air in wall of gastric antrum. Leukocytosis and lactate improved with fluid resuscitation. Remains hemodynamically stable with stable abdominal exam.    - C/w strict NPO, IVF  - IV antibiotics  - IV Protonix 40 daily  - Continue home steroids (takes prednisone 40 daily at home), inhalers  - F/U AM labs  - DVT ppx: lovenox    [facility] [id number] 60F with COPD on steroids, on longterm mesalamine (likely IBD undiagnosed), current smoker, presents with gastric ulcer with imaging concerning for small focus of air in wall of gastric antrum. Leukocytosis and lactate improved with fluid resuscitation. Remains hemodynamically stable with stable abdominal exam.    - C/w strict NPO, IVF  - IV antibiotics  - Increase IV Protonix 40 to BID  - Continue home steroids (takes prednisone 40 daily at home), inhalers  - F/U AM labs  - DVT ppx: lovenox    [facility] [id number] 60F with COPD on steroids, on longterm mesalamine (likely IBD undiagnosed), current smoker, presents with gastric ulcer with imaging concerning for small focus of air in wall of gastric antrum. Leukocytosis and lactate improved with fluid resuscitation. Remains hemodynamically stable with stable abdominal exam.    - Plan for upper GI Friday   - MRI spine for new compression fracture --> spine cs pending results  - C/w strict NPO, IVF  - IV antibiotics  - Increase IV Protonix 40 to BID  - Continue home steroids (takes prednisone 40 daily at home), inhalers  - F/u AM labs  - F/u H pylori serology  - DVT ppx: lovenox    [facility] [id number] 60F with COPD on steroids, on longterm mesalamine (likely IBD undiagnosed), current smoker, presents with gastric ulcer with imaging concerning for small focus of air in wall of gastric antrum. Leukocytosis and lactate improved with fluid resuscitation. Remains hemodynamically stable with stable abdominal exam.      - MRI spine for new compression fracture --> spine cs pending results  - C/w strict NPO, IVF  - IV antibiotics  - Increase IV Protonix 40 to BID  - Continue home steroids (takes prednisone 40 daily at home), inhalers  - F/u AM labs  - F/u H pylori serology  - DVT ppx: lovenox    [facility] [id number] 60F with COPD on steroids, on longterm mesalamine (likely IBD undiagnosed), current smoker, presents with gastric ulcer with imaging concerning for small focus of air in wall of gastric antrum. Leukocytosis and lactate improved with fluid resuscitation. Remains hemodynamically stable with stable abdominal exam.    Plan  - Upper GI series Friday  - MRI spine for new compression fracture --> spine cs pending results  - C/w strict NPO, IVF  - IV antibiotics  - Increase IV Protonix 40 to BID  - Continue home steroids (takes prednisone 40 daily at home), inhalers  - F/u AM labs  - F/u H pylori serology  - DVT ppx: lovenox    [facility] [id number]

## 2020-04-30 NOTE — CHART NOTE - NSCHARTNOTEFT_GEN_A_CORE
Interval Events:  - Patient c/o severe abdominal pain. States pain feels the same as this morning on AM rounds. Patient refused IV tylenol for pain.      O: Vital Signs  T(C): 36.5 (04-30 @ 09:48), Max: 37.1 (04-29 @ 14:51)  HR: 77 (04-30 @ 09:48) (62 - 107)  BP: 164/96 (04-30 @ 09:48) (140/91 - 170/116)  RR: 18 (04-30 @ 09:48) (18 - 40)  SpO2: 96% (04-30 @ 09:48) (96% - 100%)    Physical Exam:  General: NAD  Resp: respirations unlabored  CVS: regular rate and rhythm  Abdomen: soft, epigastric fullness and tenderness to palpation                          9.7    7.98  )-----------( 711      ( 30 Apr 2020 06:23 )             31.3   04-30    137  |  101  |  28<H>  ----------------------------<  140<H>  4.3   |  22  |  1.02    Ca    8.9      30 Apr 2020 06:23  Phos  3.3     04-30  Mg     2.5     04-30    TPro  7.5  /  Alb  4.2  /  TBili  0.1<L>  /  DBili  x   /  AST  9<L>  /  ALT  10  /  AlkPhos  107  04-29      Plan:  - Multimodal pain management with IV tylenol and narcotics  - IV tylenol now  - May have morphine if no response to IV tylenol    [facility]  [id number]

## 2020-04-30 NOTE — CHART NOTE - NSCHARTNOTEFT_GEN_A_CORE
GENERAL SURGERY NOTE:    Interval:  Paged for abd pain    Subjective:  Patient seen and examined. Reports abd pain. Denies routine use of pain meds    Exam:  Vital Signs Last 24 Hrs  T(C): 36.9 (2020 20:10), Max: 37.1 (2020 14:51)  T(F): 98.4 (2020 20:10), Max: 98.8 (2020 14:51)  HR: 78 (2020 23:10) (77 - 107)  BP: 166/97 (2020 23:10) (140/91 - 170/116)  BP(mean): --  RR: 20 (2020 23:10) (20 - 40)  SpO2: 99% (2020 23:10) (96% - 100%)    I&O's Detail      Daily Height in cm: 167.64 (2020 14:51)    Daily     MEDICATIONS  (STANDING):  acetaminophen  IVPB .. 1000 milliGRAM(s) IV Intermittent every 6 hours  budesonide  80 MICROgram(s)/formoterol 4.5 MICROgram(s) Inhaler 2 Puff(s) Inhalation two times a day  enoxaparin Injectable 40 milliGRAM(s) SubCutaneous once  fluconAZOLE IVPB 400 milliGRAM(s) IV Intermittent every 24 hours  fluconAZOLE IVPB      methylPREDNISolone sodium succinate IVPB 32 milliGRAM(s) IV Intermittent daily  morphine  - Injectable 4 milliGRAM(s) IV Push Once  pantoprazole  Injectable 40 milliGRAM(s) IV Push daily  sodium chloride 0.9%. 1000 milliLiter(s) (100 mL/Hr) IV Continuous <Continuous>  tiotropium 18 MICROgram(s) Capsule 1 Capsule(s) Inhalation daily    MEDICATIONS  (PRN):  ALBUTerol    90 MICROgram(s) HFA Inhaler 2 Puff(s) Inhalation every 6 hours PRN Shortness of Breath and/or Wheezing      Gen: NAD, resting in bed, alert and responding appropriately  Resp: Airway patent, non-labored respirations  Abd: Soft, ND, moderate TTP UQs, no rebound or guarding.   Ext: No edema, WWP  Neuro: AAOx3, no focal deficits    LABS:                        11.0   13.25 )-----------( 807      ( 2020 16:20 )             33.7         139  |  100  |  33<H>  ----------------------------<  177<H>  3.5   |  23  |  1.07    Ca    9.7      2020 16:20    TPro  7.5  /  Alb  4.2  /  TBili  0.1<L>  /  DBili  x   /  AST  9<L>  /  ALT  10  /  AlkPhos  107  04-      Urinalysis Basic - ( 2020 17:18 )    Color: Yellow / Appearance: Slightly Turbid / S.031 / pH: x  Gluc: x / Ketone: Negative  / Bili: Negative / Urobili: Negative   Blood: x / Protein: 30 mg/dL / Nitrite: Negative   Leuk Esterase: Small / RBC: 1 /hpf / WBC 4 /HPF   Sq Epi: x / Non Sq Epi: 5 /hpf / Bacteria: Negative    60F pw gastric ulcer    Plan:   - 4 morphine  - Standing IV tylenol    NILSON Woods, PGY-1  [facility]  [id number] with any questions

## 2020-04-30 NOTE — PROVIDER CONTACT NOTE (OTHER) - ASSESSMENT
Pt resting in bed, no signs of distress, a&ox3, asked by RN to fill out MRI form for scheduled MRI at 11am

## 2020-04-30 NOTE — ED ADULT NURSE REASSESSMENT NOTE - NS ED NURSE REASSESS COMMENT FT1
Patient received bed assignment on 3tower. Patient aware of assignment. Report given to receiving RN Faiz. VSS. Patient stable for transport. Chart given to charge desk. Safety and comfort maintained while in ED.

## 2020-04-30 NOTE — CHART NOTE - NSCHARTNOTEFT_GEN_A_CORE
Patient ordered for MRI thoracic and lumbar spine to evaluate new moderate to severe age-indeterminate compression fracture deformities of T10-L5 found on CT. Patient has no new back pain or focal neurologic deficits. She states that she is aware of the fractures and these were evaluated by an MD in [place]. She received treatment with "pills" and that made the pain better. She refuses any further evaluation of spine findings at this time.

## 2020-05-01 DIAGNOSIS — F05 DELIRIUM DUE TO KNOWN PHYSIOLOGICAL CONDITION: ICD-10-CM

## 2020-05-01 LAB
ANION GAP SERPL CALC-SCNC: 13 MMOL/L — SIGNIFICANT CHANGE UP (ref 5–17)
APTT BLD: 24.8 SEC — LOW (ref 27.5–36.3)
BLD GP AB SCN SERPL QL: NEGATIVE — SIGNIFICANT CHANGE UP
BUN SERPL-MCNC: 24 MG/DL — HIGH (ref 7–23)
CALCIUM SERPL-MCNC: 9.3 MG/DL — SIGNIFICANT CHANGE UP (ref 8.4–10.5)
CHLORIDE SERPL-SCNC: 98 MMOL/L — SIGNIFICANT CHANGE UP (ref 96–108)
CO2 SERPL-SCNC: 23 MMOL/L — SIGNIFICANT CHANGE UP (ref 22–31)
CREAT SERPL-MCNC: 1.08 MG/DL — SIGNIFICANT CHANGE UP (ref 0.5–1.3)
GLUCOSE SERPL-MCNC: 121 MG/DL — HIGH (ref 70–99)
HCT VFR BLD CALC: 31 % — LOW (ref 34.5–45)
HGB BLD-MCNC: 9.6 G/DL — LOW (ref 11.5–15.5)
INR BLD: 0.86 RATIO — LOW (ref 0.88–1.16)
MAGNESIUM SERPL-MCNC: 2.4 MG/DL — SIGNIFICANT CHANGE UP (ref 1.6–2.6)
MCHC RBC-ENTMCNC: 29.3 PG — SIGNIFICANT CHANGE UP (ref 27–34)
MCHC RBC-ENTMCNC: 31 GM/DL — LOW (ref 32–36)
MCV RBC AUTO: 94.5 FL — SIGNIFICANT CHANGE UP (ref 80–100)
NRBC # BLD: 0 /100 WBCS — SIGNIFICANT CHANGE UP (ref 0–0)
PHOSPHATE SERPL-MCNC: 2.5 MG/DL — SIGNIFICANT CHANGE UP (ref 2.5–4.5)
PLATELET # BLD AUTO: 651 K/UL — HIGH (ref 150–400)
POTASSIUM SERPL-MCNC: 3.6 MMOL/L — SIGNIFICANT CHANGE UP (ref 3.5–5.3)
POTASSIUM SERPL-SCNC: 3.6 MMOL/L — SIGNIFICANT CHANGE UP (ref 3.5–5.3)
PROTHROM AB SERPL-ACNC: 9.9 SEC — LOW (ref 10–13.1)
RBC # BLD: 3.28 M/UL — LOW (ref 3.8–5.2)
RBC # FLD: 13.2 % — SIGNIFICANT CHANGE UP (ref 10.3–14.5)
RH IG SCN BLD-IMP: POSITIVE — SIGNIFICANT CHANGE UP
SODIUM SERPL-SCNC: 134 MMOL/L — LOW (ref 135–145)
WBC # BLD: 7.81 K/UL — SIGNIFICANT CHANGE UP (ref 3.8–10.5)
WBC # FLD AUTO: 7.81 K/UL — SIGNIFICANT CHANGE UP (ref 3.8–10.5)

## 2020-05-01 PROCEDURE — 99222 1ST HOSP IP/OBS MODERATE 55: CPT | Mod: GC

## 2020-05-01 PROCEDURE — 74240 X-RAY XM UPR GI TRC 1CNTRST: CPT | Mod: 26

## 2020-05-01 PROCEDURE — 99232 SBSQ HOSP IP/OBS MODERATE 35: CPT | Mod: GC

## 2020-05-01 RX ORDER — LOSARTAN POTASSIUM 100 MG/1
25 TABLET, FILM COATED ORAL DAILY
Refills: 0 | Status: DISCONTINUED | OUTPATIENT
Start: 2020-05-01 | End: 2020-05-04

## 2020-05-01 RX ORDER — ACETAMINOPHEN 500 MG
650 TABLET ORAL EVERY 6 HOURS
Refills: 0 | Status: COMPLETED | OUTPATIENT
Start: 2020-05-01 | End: 2020-05-04

## 2020-05-01 RX ORDER — POTASSIUM PHOSPHATE, MONOBASIC POTASSIUM PHOSPHATE, DIBASIC 236; 224 MG/ML; MG/ML
15 INJECTION, SOLUTION INTRAVENOUS ONCE
Refills: 0 | Status: DISCONTINUED | OUTPATIENT
Start: 2020-05-01 | End: 2020-05-01

## 2020-05-01 RX ORDER — ENOXAPARIN SODIUM 100 MG/ML
40 INJECTION SUBCUTANEOUS ONCE
Refills: 0 | Status: DISCONTINUED | OUTPATIENT
Start: 2020-05-01 | End: 2020-05-01

## 2020-05-01 RX ORDER — ENOXAPARIN SODIUM 100 MG/ML
40 INJECTION SUBCUTANEOUS DAILY
Refills: 0 | Status: DISCONTINUED | OUTPATIENT
Start: 2020-05-01 | End: 2020-05-04

## 2020-05-01 RX ORDER — FOLIC ACID/VIT B COMPLEX AND C 400 MCG
20000 TABLET ORAL DAILY
Refills: 0 | Status: DISCONTINUED | OUTPATIENT
Start: 2020-05-01 | End: 2020-05-04

## 2020-05-01 RX ORDER — FOLIC ACID/VIT B COMPLEX AND C 400 MCG
20000 TABLET ORAL ONCE
Refills: 0 | Status: DISCONTINUED | OUTPATIENT
Start: 2020-05-01 | End: 2020-05-01

## 2020-05-01 RX ORDER — NICOTINE POLACRILEX 2 MG
1 GUM BUCCAL DAILY
Refills: 0 | Status: DISCONTINUED | OUTPATIENT
Start: 2020-05-01 | End: 2020-05-04

## 2020-05-01 RX ORDER — PANTOPRAZOLE SODIUM 20 MG/1
40 TABLET, DELAYED RELEASE ORAL
Refills: 0 | Status: DISCONTINUED | OUTPATIENT
Start: 2020-05-01 | End: 2020-05-04

## 2020-05-01 RX ORDER — LANOLIN ALCOHOL/MO/W.PET/CERES
3 CREAM (GRAM) TOPICAL AT BEDTIME
Refills: 0 | Status: DISCONTINUED | OUTPATIENT
Start: 2020-05-01 | End: 2020-05-04

## 2020-05-01 RX ORDER — CITALOPRAM 10 MG/1
20 TABLET, FILM COATED ORAL DAILY
Refills: 0 | Status: DISCONTINUED | OUTPATIENT
Start: 2020-05-01 | End: 2020-05-04

## 2020-05-01 RX ORDER — SUCRALFATE 1 G
1 TABLET ORAL
Refills: 0 | Status: DISCONTINUED | OUTPATIENT
Start: 2020-05-01 | End: 2020-05-04

## 2020-05-01 RX ORDER — MONTELUKAST 4 MG/1
10 TABLET, CHEWABLE ORAL DAILY
Refills: 0 | Status: DISCONTINUED | OUTPATIENT
Start: 2020-05-01 | End: 2020-05-04

## 2020-05-01 RX ORDER — ACETAMINOPHEN 500 MG
1000 TABLET ORAL ONCE
Refills: 0 | Status: DISCONTINUED | OUTPATIENT
Start: 2020-05-01 | End: 2020-05-01

## 2020-05-01 RX ORDER — MORPHINE SULFATE 50 MG/1
4 CAPSULE, EXTENDED RELEASE ORAL ONCE
Refills: 0 | Status: DISCONTINUED | OUTPATIENT
Start: 2020-05-01 | End: 2020-05-01

## 2020-05-01 RX ADMIN — BUDESONIDE AND FORMOTEROL FUMARATE DIHYDRATE 2 PUFF(S): 160; 4.5 AEROSOL RESPIRATORY (INHALATION) at 18:02

## 2020-05-01 RX ADMIN — BUDESONIDE AND FORMOTEROL FUMARATE DIHYDRATE 2 PUFF(S): 160; 4.5 AEROSOL RESPIRATORY (INHALATION) at 05:43

## 2020-05-01 RX ADMIN — Medication 62.5 MILLIMOLE(S): at 12:52

## 2020-05-01 RX ADMIN — PIPERACILLIN AND TAZOBACTAM 25 GRAM(S): 4; .5 INJECTION, POWDER, LYOPHILIZED, FOR SOLUTION INTRAVENOUS at 14:54

## 2020-05-01 RX ADMIN — Medication 400 MILLIGRAM(S): at 01:57

## 2020-05-01 RX ADMIN — Medication 1 PATCH: at 22:17

## 2020-05-01 RX ADMIN — MORPHINE SULFATE 4 MILLIGRAM(S): 50 CAPSULE, EXTENDED RELEASE ORAL at 06:41

## 2020-05-01 RX ADMIN — PIPERACILLIN AND TAZOBACTAM 25 GRAM(S): 4; .5 INJECTION, POWDER, LYOPHILIZED, FOR SOLUTION INTRAVENOUS at 21:37

## 2020-05-01 RX ADMIN — Medication 650 MILLIGRAM(S): at 17:14

## 2020-05-01 RX ADMIN — Medication 101.6 MILLIGRAM(S): at 05:42

## 2020-05-01 RX ADMIN — PANTOPRAZOLE SODIUM 40 MILLIGRAM(S): 20 TABLET, DELAYED RELEASE ORAL at 05:43

## 2020-05-01 RX ADMIN — Medication 0.25 MILLIGRAM(S): at 10:09

## 2020-05-01 RX ADMIN — Medication 20000 UNIT(S): at 21:37

## 2020-05-01 RX ADMIN — PIPERACILLIN AND TAZOBACTAM 25 GRAM(S): 4; .5 INJECTION, POWDER, LYOPHILIZED, FOR SOLUTION INTRAVENOUS at 06:29

## 2020-05-01 RX ADMIN — SODIUM CHLORIDE 75 MILLILITER(S): 9 INJECTION, SOLUTION INTRAVENOUS at 14:54

## 2020-05-01 NOTE — BEHAVIORAL HEALTH ASSESSMENT NOTE - NSBHCHARTREVIEWLAB_PSY_A_CORE FT
9.6    7.81  )-----------( 651      ( 01 May 2020 06:37 )             31.0   05-01    134<L>  |  98  |  24<H>  ----------------------------<  121<H>  3.6   |  23  |  1.08    Ca    9.3      01 May 2020 06:37  Phos  2.5     05-01  Mg     2.4     05-01    TPro  7.5  /  Alb  4.2  /  TBili  0.1<L>  /  DBili  x   /  AST  9<L>  /  ALT  10  /  AlkPhos  107  04-29

## 2020-05-01 NOTE — DIETITIAN INITIAL EVALUATION ADULT. - OTHER INFO
pt on one to one. nurse reports for pt safety. pt threatens to hurt herself if she does get the medication she demands for pain. pt anxious.   Reason for admission : abdominal pain  Diet PTA : protein 2 x daily, cereal, milk, healthy snacks and ensure. she lives with her friend and prepares her own meals.   Intake :she has not had a meals since admission, has an appetite and anticipates being able to eat well when she is able to receive a meal.   Denies nausea/vomit :  Denies difficulty chewing /swallow :  Denies diarrhea/constipation:  Last BM : today  NKFA  IBW +/- 10%= 132.5pounds  Ht: 65.5"  Ht taken from pt  Usual Weight PTA: 120-125pounds  BMI: 19.1  BMI calculated using wt from flow sheet  BMI calculated using ht from pt  Education Provided : N/A  pressure injury: none  edema:  none

## 2020-05-01 NOTE — PROGRESS NOTE ADULT - ASSESSMENT
60F with COPD on steroids, on longterm mesalamine (likely IBD undiagnosed), current smoker, presents with gastric ulcer with imaging concerning for small focus of air in wall of gastric antrum. Leukocytosis and lactate improved with fluid resuscitation. Remains hemodynamically stable with stable abdominal exam.    Plan  - Upper GI series today  - C/w strict NPO, IVF  - IV antibiotics and antifungal  - IV Protonix 40 to BID  - Continue home steroids (takes prednisone 40 daily at home), inhalers  - F/u AM labs  - DVT ppx: lovenox

## 2020-05-01 NOTE — BEHAVIORAL HEALTH ASSESSMENT NOTE - CASE SUMMARY
This is a 60-y.o. CF patient, Jain, domiciled with roommate in [place], , has 21 y.o. daughter who lives with father, on disability for COPD, PMHx of COPD on steroids, on long-term mesalamine (likely IBD undiagnosed), no known PPHx, no outpt psychiatrist, no past psych hospitalizations, no past psych dx, no past SA/SIB, no hx of violence, no legal hx, no psych FH, was on lexapro for Hep C tx ~5 yrs ago, 40 yr smoker (5 cigs/day since 1 yr ago, not smoking for 2 yrs prior to that), occasional alcohol use, no other substance use, admitted for gastric ulcer, consulted for agitation and recs re level of observation for disposition/discharge planning.    I have seen and evaluated this patient myself. Chart, labs, meds reviewed. I agree with resident's assessment and plan.

## 2020-05-01 NOTE — PROVIDER CONTACT NOTE (OTHER) - BACKGROUND
Pt admitted with abdominal pain. Pt requesting morphine but cannot receive narcotics per surg team. Multiple attempts throughout night to calm pt down.

## 2020-05-01 NOTE — BEHAVIORAL HEALTH ASSESSMENT NOTE - RISK ASSESSMENT
Low Acute Suicide Risk Risk factors: chronic medical conditions    Protective factors: no current SIIP/HIIP, no h/o SA/SIB, no h/o psych admissions, no access to weapons, no active substance abuse, no psychosis, has a child, domiciled, and has social support.    Overall, pt is at low risk of harm to self/others.

## 2020-05-01 NOTE — DIETITIAN INITIAL EVALUATION ADULT. - PERTINENT MEDS FT
acetaminophen  IVPB .. 1000  ALBUTerol    90 MICROgram(s) HFA Inhaler 2 PRN  budesonide  80 MICROgram(s)/formoterol 4.5 MICROgram(s) Inhaler 2  dextrose 5% + sodium chloride 0.45%. 1000  enoxaparin Injectable 40  fluconAZOLE IVPB 400  fluconAZOLE IVPB   methylPREDNISolone sodium succinate IVPB 32  pantoprazole  Injectable 40  piperacillin/tazobactam IVPB.. 3.375  sodium phosphate IVPB 15  tiotropium 18 MICROgram(s) Capsule 1

## 2020-05-01 NOTE — DIETITIAN INITIAL EVALUATION ADULT. - ADD RECOMMEND
advance diet when medically feasible. provide Glucerna 2 x daily when diet advanced-pt with elevated Glucose most likely related to pt taking prednisone PTA. recommend HgbA1c.

## 2020-05-01 NOTE — BEHAVIORAL HEALTH ASSESSMENT NOTE - SUICIDE PROTECTIVE FACTORS
Supportive social network of family or friends/Responsibility to family and others/Oriental orthodox beliefs

## 2020-05-01 NOTE — PROGRESS NOTE ADULT - SUBJECTIVE AND OBJECTIVE BOX
Interval Events:  - HCV+, discussed with pt, states she has had Tx.  - Refused MRI spine  - Refused tylenol  - Refused fluconazole  - Received carafate  - H pylori serology negative    S: Patient c/o abdominal pain, refusing non-opioid management. Denies chills, nausea, emesis, chest pain, SOB.    O: Vital Signs  T(C): 36.6 (05-01 @ 00:56), Max: 37.1 (04-30 @ 02:01)  HR: 70 (05-01 @ 00:56) (62 - 77)  BP: 138/89 (05-01 @ 00:56) (138/89 - 164/96)  RR: 18 (05-01 @ 00:56) (18 - 18)  SpO2: 97% (05-01 @ 00:56) (96% - 99%)  04-30-20 @ 07:01  -  05-01-20 @ 01:03  --------------------------------------------------------  IN: 900 mL / OUT: 0 mL / NET: 900 mL        Physical Exam:  General: NAD  Resp: respirations unlabored  Abdomen: soft, epigastric fullness and TTP, no involuntary guarding or rebound  Extremities: no edema  Skin: warm, dry, appropriate color                          9.7    7.98  )-----------( 711      ( 30 Apr 2020 06:23 )             31.3   04-30    137  |  101  |  28<H>  ----------------------------<  140<H>  4.3   |  22  |  1.02    Ca    8.9      30 Apr 2020 06:23  Phos  3.3     04-30  Mg     2.5     04-30    TPro  7.5  /  Alb  4.2  /  TBili  0.1<L>  /  DBili  x   /  AST  9<L>  /  ALT  10  /  AlkPhos  107  04-29

## 2020-05-01 NOTE — PROVIDER CONTACT NOTE (OTHER) - NAME OF MD/NP/PA/DO NOTIFIED:
MD Sheryl Blunt Pt 74 y/o female sent from PCP presenting with complaints of low H&H. PMH of anemia & hip replacement 3 years ago. Last blood transfusion was about three years ago and states there was no complications. Pt states she is feeling slightly light headed.  Pt Axox4, lAbdomen soft, non-tender, non-distended. Skin warm, dry, and intact. Safety and comfort measures maintained.  present at bedside. Pt 74 y/o female sent from PCP presenting with complaints of low H&H. PMH of anemia & hip replacement 3 years ago. Last blood transfusion was about three years ago and states there was no complications. Pt states she is feeling slightly light headed but no other symptoms. Denies LOC, SOB, chest pain, fever, chills, N/V/D.  Pt Axox4, Abdomen soft, non-tender, non-distended. Skin warm, dry, and intact. VSS. 18G IV L AC. Safety and comfort measures maintained.  present at bedside. Pt 74 y/o female sent from PCP presenting with complaints of low H&H. PMH of anemia & hip replacement 3 years ago. Last blood transfusion was about three years ago and states there was no complications. Pt states she is feeling slightly light headed but no other symptoms. Denies LOC, SOB, chest pain, fever, chills, N/V/D.  Pt Axox4, Skin Pale. Abdomen soft, non-tender, non-distended. Skin warm, dry, and intact. VSS. 18G IV L AC. Safety and comfort measures maintained.  present at bedside.

## 2020-05-01 NOTE — PROVIDER CONTACT NOTE (OTHER) - ASSESSMENT
Pt AOx3, VSS. Pt with RAYO with O2 97% on RA. Pt agitated, yelling and cursing at staff. Pt threatening to leave and pull out IV
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
## 2020-05-01 NOTE — PROVIDER CONTACT NOTE (OTHER) - ASSESSMENT
Pt AOx4, denies diflucan IVPB. Pt in pain, refusing IV Tylenol only requesting morphine for pain. MD aware. VSS

## 2020-05-01 NOTE — BEHAVIORAL HEALTH ASSESSMENT NOTE - PATIENT'S CHIEF COMPLAINT
"Today, I'm good. Yesterday, I was in extreme pain and so my behavior was out of control. My behavior was unruly."

## 2020-05-01 NOTE — PROVIDER CONTACT NOTE (MEDICATION) - BACKGROUND
Patient admitted for "other specified symptoms and signs involving the digestive system and abdomen.

## 2020-05-01 NOTE — PROVIDER CONTACT NOTE (MEDICATION) - ASSESSMENT
Patient is AOx4, patient was educated on both lovenox & celexa medication.  Education involved the purpose, risks & benefits of both.  Patient verbalized understanding was adamant about refusing.

## 2020-05-01 NOTE — DIETITIAN INITIAL EVALUATION ADULT. - PHYSICAL APPEARANCE
underweight/Unable to conduct Nutrition Focused Physical Assessment due to contact/isolation precautions.
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
## 2020-05-01 NOTE — BEHAVIORAL HEALTH ASSESSMENT NOTE - NSBHCHARTREVIEWVS_PSY_A_CORE FT
Vital Signs Last 24 Hrs  T(C): 36.8 (01 May 2020 12:37), Max: 37 (30 Apr 2020 20:59)  T(F): 98.2 (01 May 2020 12:37), Max: 98.6 (30 Apr 2020 20:59)  HR: 75 (01 May 2020 12:37) (60 - 75)  BP: 161/95 (01 May 2020 12:37) (138/89 - 161/95)  BP(mean): --  RR: 18 (01 May 2020 12:37) (18 - 18)  SpO2: 98% (01 May 2020 12:37) (96% - 99%)

## 2020-05-02 ENCOUNTER — TRANSCRIPTION ENCOUNTER (OUTPATIENT)
Age: 60
End: 2020-05-02

## 2020-05-02 RX ORDER — ACETAMINOPHEN 500 MG
2 TABLET ORAL
Qty: 0 | Refills: 0 | DISCHARGE
Start: 2020-05-02

## 2020-05-02 RX ORDER — PANTOPRAZOLE SODIUM 20 MG/1
1 TABLET, DELAYED RELEASE ORAL
Qty: 0 | Refills: 0 | DISCHARGE
Start: 2020-05-02

## 2020-05-02 RX ORDER — NICOTINE POLACRILEX 2 MG
0 GUM BUCCAL
Qty: 0 | Refills: 0 | DISCHARGE
Start: 2020-05-02

## 2020-05-02 RX ORDER — SUCRALFATE 1 G
1 TABLET ORAL
Qty: 0 | Refills: 0 | DISCHARGE
Start: 2020-05-02

## 2020-05-02 RX ORDER — LOSARTAN POTASSIUM 100 MG/1
1 TABLET, FILM COATED ORAL
Qty: 0 | Refills: 0 | DISCHARGE
Start: 2020-05-02

## 2020-05-02 RX ORDER — CITALOPRAM 10 MG/1
1 TABLET, FILM COATED ORAL
Qty: 0 | Refills: 0 | DISCHARGE
Start: 2020-05-02

## 2020-05-02 RX ORDER — LANOLIN ALCOHOL/MO/W.PET/CERES
1 CREAM (GRAM) TOPICAL
Qty: 0 | Refills: 0 | DISCHARGE
Start: 2020-05-02

## 2020-05-02 RX ADMIN — Medication 1 PATCH: at 11:32

## 2020-05-02 RX ADMIN — Medication 1 PATCH: at 07:36

## 2020-05-02 RX ADMIN — BUDESONIDE AND FORMOTEROL FUMARATE DIHYDRATE 2 PUFF(S): 160; 4.5 AEROSOL RESPIRATORY (INHALATION) at 17:09

## 2020-05-02 RX ADMIN — PIPERACILLIN AND TAZOBACTAM 25 GRAM(S): 4; .5 INJECTION, POWDER, LYOPHILIZED, FOR SOLUTION INTRAVENOUS at 11:35

## 2020-05-02 RX ADMIN — SODIUM CHLORIDE 75 MILLILITER(S): 9 INJECTION, SOLUTION INTRAVENOUS at 00:53

## 2020-05-02 RX ADMIN — FLUCONAZOLE 100 MILLIGRAM(S): 150 TABLET ORAL at 01:31

## 2020-05-02 RX ADMIN — FLUCONAZOLE 100 MILLIGRAM(S): 150 TABLET ORAL at 23:59

## 2020-05-02 RX ADMIN — Medication 1 PATCH: at 17:11

## 2020-05-02 RX ADMIN — Medication 1 PATCH: at 21:24

## 2020-05-02 RX ADMIN — PANTOPRAZOLE SODIUM 40 MILLIGRAM(S): 20 TABLET, DELAYED RELEASE ORAL at 17:10

## 2020-05-02 RX ADMIN — PIPERACILLIN AND TAZOBACTAM 25 GRAM(S): 4; .5 INJECTION, POWDER, LYOPHILIZED, FOR SOLUTION INTRAVENOUS at 21:57

## 2020-05-02 RX ADMIN — Medication 650 MILLIGRAM(S): at 17:10

## 2020-05-02 NOTE — DISCHARGE NOTE PROVIDER - NSDCMRMEDTOKEN_GEN_ALL_CORE_FT
acetaminophen 325 mg oral tablet: 2 tab(s) orally every 6 hours  citalopram 20 mg oral tablet: 1 tab(s) orally once a day  losartan 25 mg oral tablet: 1 tab(s) orally once a day  melatonin 3 mg oral tablet: 1 tab(s) orally once a day (at bedtime)  nicotine 7 mg/24 hr transdermal film, extended release:  transdermal   pantoprazole 40 mg oral delayed release tablet: 1 tab(s) orally 2 times a day  predniSONE 50 mg oral tablet: 1 tab(s) orally once a day   Singulair 10 mg oral tablet: 1 tab(s) orally once a day (in the evening)  Spiriva 18 mcg inhalation capsule: 1 each inhaled once a day  sucralfate 1 g oral tablet: 1 tab(s) orally 4 times a day  Symbicort 80 mcg-4.5 mcg/inh inhalation aerosol: 2 puff(s) inhaled 2 times a day  Ventolin CFC free 90 mcg/inh inhalation aerosol: 2 puff(s) inhaled 4 times a day acetaminophen 325 mg oral tablet: 2 tab(s) orally every 6 hours  citalopram 20 mg oral tablet: 1 tab(s) orally once a day  enoxaparin: 40 milligram(s) subcutaneous once a day  losartan 25 mg oral tablet: 1 tab(s) orally once a day  melatonin 3 mg oral tablet: 1 tab(s) orally once a day (at bedtime)  nicotine 7 mg/24 hr transdermal film, extended release:  transdermal   pantoprazole 40 mg oral delayed release tablet: 1 tab(s) orally 2 times a day  predniSONE 50 mg oral tablet: 1 tab(s) orally once a day   Singulair 10 mg oral tablet: 1 tab(s) orally once a day (in the evening)  Spiriva 18 mcg inhalation capsule: 1 each inhaled once a day  sucralfate 1 g oral tablet: 1 tab(s) orally 4 times a day  Symbicort 80 mcg-4.5 mcg/inh inhalation aerosol: 2 puff(s) inhaled 2 times a day  Ventolin CFC free 90 mcg/inh inhalation aerosol: 2 puff(s) inhaled 4 times a day

## 2020-05-02 NOTE — PROVIDER CONTACT NOTE (MEDICATION) - ASSESSMENT
Patient is AOx4, pt is educated on all the purpose, risks & benefits of medications.  Patient verbalizes understanding & refuses.

## 2020-05-02 NOTE — DISCHARGE NOTE PROVIDER - NSDCFUADDAPPT_GEN_ALL_CORE_FT
Please follow up with Primary Medical Doctor this week to go over medications.  Please follow up with GI specialist

## 2020-05-02 NOTE — DISCHARGE NOTE PROVIDER - NSDCFUADDINST_GEN_ALL_CORE_FT
Please return to ED if you experience persistent fevers, nausea, vomiting, abdominal pain not controlled by medication or bloody vomit/ sputum.

## 2020-05-02 NOTE — PROVIDER CONTACT NOTE (MEDICATION) - SITUATION
Patient refusing all AM medications: acetaminophen, losartan, protonix, zosyn, prednisone, & carafate

## 2020-05-02 NOTE — PROGRESS NOTE ADULT - ASSESSMENT
60F with COPD on steroids, on longterm mesalamine (likely IBD undiagnosed), current smoker, presents with gastric ulcer with imaging concerning for small focus of air in wall of gastric antrum. Leukocytosis and lactate improved with fluid resuscitation. Remains hemodynamically stable with stable abdominal exam. Pt now on clears and tolerating.     Plan  - advance diet  - pt otherwise cleared for discharge, pending CM placement to nursing home  - c/w non-opiate analgesia  - c/w home meds + nicotine patch  - DVT ppx: lovenox    [id number]

## 2020-05-02 NOTE — PROGRESS NOTE ADULT - SUBJECTIVE AND OBJECTIVE BOX
Interval Events:  - upper GI study negative for perf  - advanced to [facility]  - Cleared by psych to d/c 1:1  - Refused lovenox, celexa    S: Patient reporting improvement in pain. Honoring agreement to stop requesting narcotics now that she is on a diet.    O: Vital Signs Last 24 Hrs  T(C): 36.3 (02 May 2020 01:04), Max: 36.9 (01 May 2020 05:57)  T(F): 97.3 (02 May 2020 01:04), Max: 98.4 (01 May 2020 05:57)  HR: 81 (02 May 2020 01:04) (60 - 81)  BP: 132/71 (02 May 2020 01:04) (132/71 - 161/95)  BP(mean): --  RR: 19 (02 May 2020 01:04) (18 - 19)  SpO2: 95% (02 May 2020 01:04) (95% - 99%)  	    Physical Exam:  General: NAD  Resp: respirations unlabored  Abdomen: soft, mild epigastric TTP, nondistended, no involuntary guarding or rebound  Extremities: no edema  Skin: warm, dry, appropriate color                                       9.6    7.81  )-----------( 651      ( 01 May 2020 06:37 )             31.0     05-01    134<L>  |  98  |  24<H>  ----------------------------<  121<H>  3.6   |  23  |  1.08    Ca    9.3      01 May 2020 06:37  Phos  2.5     05-01  Mg     2.4     05-01

## 2020-05-02 NOTE — DISCHARGE NOTE PROVIDER - CARE PROVIDER_API CALL
Drew Andrade)  ColonRectal Surgery; Surgery  [address], [address]  [place] [address]  Phone: [phone]  Fax: [phone]  Follow Up Time:

## 2020-05-03 LAB
HCT VFR BLD CALC: 29.9 % — LOW (ref 34.5–45)
HGB BLD-MCNC: 9.4 G/DL — LOW (ref 11.5–15.5)
MCHC RBC-ENTMCNC: 29.5 PG — SIGNIFICANT CHANGE UP (ref 27–34)
MCHC RBC-ENTMCNC: 31.4 GM/DL — LOW (ref 32–36)
MCV RBC AUTO: 93.7 FL — SIGNIFICANT CHANGE UP (ref 80–100)
NRBC # BLD: 0 /100 WBCS — SIGNIFICANT CHANGE UP (ref 0–0)
PLATELET # BLD AUTO: 489 K/UL — HIGH (ref 150–400)
RBC # BLD: 3.19 M/UL — LOW (ref 3.8–5.2)
RBC # FLD: 13.3 % — SIGNIFICANT CHANGE UP (ref 10.3–14.5)
WBC # BLD: 7.98 K/UL — SIGNIFICANT CHANGE UP (ref 3.8–10.5)
WBC # FLD AUTO: 7.98 K/UL — SIGNIFICANT CHANGE UP (ref 3.8–10.5)

## 2020-05-03 PROCEDURE — 99232 SBSQ HOSP IP/OBS MODERATE 35: CPT | Mod: GC

## 2020-05-03 RX ADMIN — Medication 1 PATCH: at 20:55

## 2020-05-03 RX ADMIN — Medication 650 MILLIGRAM(S): at 12:01

## 2020-05-03 RX ADMIN — Medication 650 MILLIGRAM(S): at 17:00

## 2020-05-03 RX ADMIN — Medication 1 PATCH: at 12:04

## 2020-05-03 RX ADMIN — Medication 1 PATCH: at 12:11

## 2020-05-03 NOTE — PROGRESS NOTE ADULT - SUBJECTIVE AND OBJECTIVE BOX
[facility] Surgical Progress Note  Patient is a 60y old  Female who presents with a chief complaint of abdominal pain (02 May 2020 09:28)      SUBJECTIVE: Patient seen and examined at bedside with surgical team, Patient states that she cannot breathe despite having normal vital signs. Patient states that her pain is severe and worsening, asking specifically for "narcotics or prednisone."  She also complains of diarrhea- but cannot state how often it has occurred. She admits to tolerating her soft diet and denies nausea and abdominal pain with eating.    Vital Signs Last 24 Hrs  T(C): 36.6 (02 May 2020 20:23), Max: 36.7 (02 May 2020 04:51)  T(F): 97.8 (02 May 2020 20:23), Max: 98 (02 May 2020 04:51)  HR: 78 (02 May 2020 20:23) (72 - 85)  BP: 138/79 (02 May 2020 20:23) (117/96 - 147/80)  BP(mean): --  RR: 22 (02 May 2020 20:23) (19 - 26)  SpO2: 97% (02 May 2020 20:23) (94% - 97%)        01 May 2020 07:01  -  02 May 2020 07:00  --------------------------------------------------------  IN:    dextrose 5% + sodium chloride 0.45%.: 900 mL    IV PiggyBack: 450 mL    Oral Fluid: 860 mL  Total IN: 2210 mL    OUT:  Total OUT: 0 mL    Total NET: 2210 mL      02 May 2020 07:01  -  02 May 2020 20:19  --------------------------------------------------------  IN:    dextrose 5% + sodium chloride 0.45%.: 225 mL    IV PiggyBack: 100 mL    Oral Fluid: 840 mL  Total IN: 1165 mL    OUT:  Total OUT: 0 mL    Total NET: 1165 mL      MEDICATIONS  (STANDING):  acetaminophen   Tablet .. 650 milliGRAM(s) Oral every 6 hours  budesonide  80 MICROgram(s)/formoterol 4.5 MICROgram(s) Inhaler 2 Puff(s) Inhalation two times a day  citalopram 20 milliGRAM(s) Oral daily  enoxaparin Injectable 40 milliGRAM(s) SubCutaneous daily  fluconAZOLE IVPB 400 milliGRAM(s) IV Intermittent every 24 hours  fluconAZOLE IVPB      losartan 25 milliGRAM(s) Oral daily  melatonin 3 milliGRAM(s) Oral at bedtime  montelukast 10 milliGRAM(s) Oral daily  nicotine -   7 mG/24Hr(s) Patch 1 patch Transdermal daily  pantoprazole    Tablet 40 milliGRAM(s) Oral two times a day  piperacillin/tazobactam IVPB.. 3.375 Gram(s) IV Intermittent every 8 hours  predniSONE   Tablet 50 milliGRAM(s) Oral daily  sucralfate 1 Gram(s) Oral four times a day  tiotropium 18 MICROgram(s) Capsule 1 Capsule(s) Inhalation daily  vitamin A 24379 Unit(s) Oral daily    MEDICATIONS  (PRN):  ALBUTerol    90 MICROgram(s) HFA Inhaler 2 Puff(s) Inhalation every 6 hours PRN Shortness of Breath and/or Wheezing      Physical Exam  Constitutional: sleeping in bed comfortably, once patient notices company in room starts hyperventilating and appears anxious  Respiratory: CTA b/l  Cardiac: RRR, S1 and S2, no m/r/g  Gastrointestinal: abdomen soft, NT/ND    LABS:                        9.6    7.81  )-----------( 651      ( 01 May 2020 06:37 )             31.0     05-01    134<L>  |  98  |  24<H>  ----------------------------<  121<H>  3.6   |  23  |  1.08    Ca    9.3      01 May 2020 06:37  Phos  2.5     05-01  Mg     2.4     05-01      PT/INR - ( 01 May 2020 08:39 )   PT: 9.9 sec;   INR: 0.86 ratio         PTT - ( 01 May 2020 08:39 )  PTT:24.8 sec

## 2020-05-03 NOTE — PHYSICAL THERAPY INITIAL EVALUATION ADULT - ADDITIONAL COMMENTS
Pt says she was living at a LTC facility for about 2 years. Recently left facility and moved in with friend. Pt says friend's name was "Brayan" (although CM note states "Kavin"). Friend's house was a 1st floor co-op with no stairs to enter. Pt confirms she does not have a home and is unsure where to go if d/c. Pt agreeable to LTC if not d/c to friend's residence. Pt says she was independent with ADLs and functional mobility. Has used a RW at time but does not know where it is currently.

## 2020-05-03 NOTE — PROVIDER CONTACT NOTE (OTHER) - ACTION/TREATMENT ORDERED:
MD Mckeon made aware, as per MD Mike RENTERIA will visit patient to discuss her refusal of medications.
Constant observation 1:1 ordered. Continue to monitor and notify of any changes
Continue to educate pt, no new changes. Will continue to monitor
MD aware, MD to see Pt at bedside and discuss test, cont to monitor
Pt already has morphine 4mg IVP ordered from ED, MD Peggy Bush approves of med at this time.  Will administer morphine IVP.  Will continue to monitor pt.

## 2020-05-03 NOTE — PROGRESS NOTE ADULT - ASSESSMENT
59yo F with COPD on steroids, on longterm mesalamine (likely IBD undiagnosed), current smoker, presents with gastric ulcer with imaging concerning for small focus of air in wall of gastric antrum. Leukocytosis and lactate improved with fluid resuscitation. Remains hemodynamically stable with stable abdominal exam. Patient is tolerating mechanical soft diet and +/+ for bowel function.     Plan  - Soft Diet  - F/u PT consult; discharge pending placement by Case Management  - c/w non-opiate analgesia  - avoid Ativan or other sedating medications  - c/w home meds + nicotine patch  - DVT ppx: lovenox      [id number]

## 2020-05-03 NOTE — PHYSICAL THERAPY INITIAL EVALUATION ADULT - GENERAL OBSERVATIONS, REHAB EVAL
GHASSAN Donahue cleared for tx. Rec'd supine, NAD, +2LO2NC, pt agreeable to PT. Pursed lipped breathing at rest 2/2 SOB.

## 2020-05-03 NOTE — PHYSICAL THERAPY INITIAL EVALUATION ADULT - PERTINENT HX OF CURRENT PROBLEM, REHAB EVAL
n the ED, patient tachycardic. Exam with tenderness in the right upper quadrant and epigastrium, no rebound or guarding, not peritoneal.

## 2020-05-03 NOTE — PHYSICAL THERAPY INITIAL EVALUATION ADULT - PLANNED THERAPY INTERVENTIONS, PT EVAL
balance training/gait training balance training/gait training/transfer training/bed mobility training

## 2020-05-04 ENCOUNTER — TRANSCRIPTION ENCOUNTER (OUTPATIENT)
Age: 60
End: 2020-05-04

## 2020-05-04 VITALS
TEMPERATURE: 99 F | RESPIRATION RATE: 23 BRPM | SYSTOLIC BLOOD PRESSURE: 119 MMHG | DIASTOLIC BLOOD PRESSURE: 74 MMHG | OXYGEN SATURATION: 100 % | HEART RATE: 68 BPM

## 2020-05-04 PROCEDURE — 99232 SBSQ HOSP IP/OBS MODERATE 35: CPT | Mod: GC

## 2020-05-04 RX ORDER — ENOXAPARIN SODIUM 100 MG/ML
40 INJECTION SUBCUTANEOUS
Qty: 0 | Refills: 0 | DISCHARGE
Start: 2020-05-04 | End: 2020-06-01

## 2020-05-04 RX ADMIN — Medication 650 MILLIGRAM(S): at 03:44

## 2020-05-04 RX ADMIN — Medication 1 PATCH: at 07:47

## 2020-05-04 RX ADMIN — Medication 1 PATCH: at 03:42

## 2020-05-04 RX ADMIN — Medication 1 PATCH: at 12:50

## 2020-05-04 RX ADMIN — Medication 1 PATCH: at 12:51

## 2020-05-04 NOTE — PROGRESS NOTE ADULT - ASSESSMENT
59yo F with COPD on steroids, on longterm mesalamine (likely IBD undiagnosed), current smoker, presents with gastric ulcer with imaging concerning for small focus of air in wall of gastric antrum. Leukocytosis and lactate improved with fluid resuscitation. Remains hemodynamically stable with stable abdominal exam. UGI study showing duodenitis with no active extravasation. Patient is tolerating mechanical soft diet and +/+ for bowel function.     Plan  - Soft Diet  - c/w non-opiate analgesia  - c/w antibiotics to be completed today  - avoid Ativan or other sedating medications  - c/w home meds + nicotine patch  - DVT ppx: lovenox  - Dispo: [id number]

## 2020-05-04 NOTE — DISCHARGE NOTE NURSING/CASE MANAGEMENT/SOCIAL WORK - PATIENT PORTAL LINK FT
You can access the FollowMyHealth Patient Portal offered by [facility] by registering at the following website: [email or web address]. By joining [employer]’s FollowMyHealth portal, you will also be able to view your health information using other applications (apps) compatible with our system.

## 2020-05-04 NOTE — PROGRESS NOTE ADULT - ATTENDING COMMENTS
I have evaluated the relevant clinical findings and plan with the surgical housestaff and/or fellow.  Agree with the above documentation with addenda as noted.     UGIS without evidence of contrast leak  Tolerating soft diet  C/o some diarrhea  Chronic pain    Check CBC for WBC today  Monitor stools  Cont diet as tolerated  Working on karin Martel MD
I have seen and evaluated the patient and discussed the relevant clinical findings and plan with the surgical housestaff and fellow.  Agree with the above documentation with addenda as noted.     Clinically improved  WBC remains normal  Abdomen is soft    Plan for UGIS today  Cont abx    Lucian Martel MD
I have seen and evaluated the patient and discussed the relevant clinical findings and plan with the surgical housestaff and fellow.  Agree with the above documentation with addenda as noted.     Continues to refuse meds -- It was explained to her the importance of medication compliance.  Otherwise doing well and tolerating PO without abdominal pain.  Dispo planning to rehab.    Lucian Martel MD
I have seen and examined the patient. I agree with the above surgery resident's note.  adv diet  d/c planning
I have seen and evaluated the patient and discussed the relevant clinical findings and plan with the surgical housestaff and fellow.  Agree with the above documentation with addenda as noted.     60y F with contained, perforated peptic ulcer without peritonitis.  She says pain is improved  Hx of chronic steroid use for COPD and smoker    Afebrile, NAD  abdomen is soft, mildly tender to palpation in epigastrium, no guarding or rebound    Plan for abx, antifungal  NPO  Serial abdominal exams  If exam is improved tomorrow will plan on UGIS before restarting diet    Lucian Martel MD

## 2020-05-04 NOTE — PROGRESS NOTE ADULT - SUBJECTIVE AND OBJECTIVE BOX
Interval events: Evaluated by PT, recommended home PT w/RW. Per CM patient will need [facility]IE to receive these services as patient is homeless.    S: Patient tolerating regular diet, +/+ with loose BM. Pain moderately controlled. Yesterday patient refusing antibiotics, lovenox, protonix, carafate. Continues to c/o anxiety and dyspnea, only while provider/RN is visibly present, if unaware of presence will rest comfortably in bed.    O: Vital Signs  T(C): 37.1 (05-03 @ 16:55), Max: 37.1 (05-03 @ 16:55)  HR: 65 (05-03 @ 16:55) (56 - 81)  BP: 127/71 (05-03 @ 16:55) (120/75 - 142/78)  RR: 24 (05-03 @ 16:55) (22 - 24)  SpO2: 99% (05-03 @ 16:55) (96% - 99%)  05-02-20 @ 07:01  -  05-03-20 @ 07:00  --------------------------------------------------------  IN: 1165 mL / OUT: 0 mL / NET: 1165 mL    05-03-20 @ 07:01  -  05-04-20 @ 01:36  --------------------------------------------------------  IN: 300 mL / OUT: 0 mL / NET: 300 mL      General: alert and oriented, appears anxious  Resp: airway patent, respirations unlabored  CVS: regular rate and rhythm  Abdomen: soft, nontender, nondistended  Extremities: no edema  Skin: warm, dry, appropriate color                          9.4    7.98  )-----------( 489      ( 03 May 2020 09:09 )             29.9

## 2020-06-02 PROCEDURE — 83735 ASSAY OF MAGNESIUM: CPT

## 2020-06-02 PROCEDURE — 82435 ASSAY OF BLOOD CHLORIDE: CPT

## 2020-06-02 PROCEDURE — 83690 ASSAY OF LIPASE: CPT

## 2020-06-02 PROCEDURE — 80053 COMPREHEN METABOLIC PANEL: CPT

## 2020-06-02 PROCEDURE — 85610 PROTHROMBIN TIME: CPT

## 2020-06-02 PROCEDURE — 85027 COMPLETE CBC AUTOMATED: CPT

## 2020-06-02 PROCEDURE — 71045 X-RAY EXAM CHEST 1 VIEW: CPT

## 2020-06-02 PROCEDURE — 99285 EMERGENCY DEPT VISIT HI MDM: CPT | Mod: 25

## 2020-06-02 PROCEDURE — 84295 ASSAY OF SERUM SODIUM: CPT

## 2020-06-02 PROCEDURE — 86850 RBC ANTIBODY SCREEN: CPT

## 2020-06-02 PROCEDURE — 83605 ASSAY OF LACTIC ACID: CPT

## 2020-06-02 PROCEDURE — 82947 ASSAY GLUCOSE BLOOD QUANT: CPT

## 2020-06-02 PROCEDURE — 74177 CT ABD & PELVIS W/CONTRAST: CPT

## 2020-06-02 PROCEDURE — 97161 PT EVAL LOW COMPLEX 20 MIN: CPT

## 2020-06-02 PROCEDURE — 84100 ASSAY OF PHOSPHORUS: CPT

## 2020-06-02 PROCEDURE — 84132 ASSAY OF SERUM POTASSIUM: CPT

## 2020-06-02 PROCEDURE — 85014 HEMATOCRIT: CPT

## 2020-06-02 PROCEDURE — 96375 TX/PRO/DX INJ NEW DRUG ADDON: CPT

## 2020-06-02 PROCEDURE — 82330 ASSAY OF CALCIUM: CPT

## 2020-06-02 PROCEDURE — 86803 HEPATITIS C AB TEST: CPT

## 2020-06-02 PROCEDURE — 87635 SARS-COV-2 COVID-19 AMP PRB: CPT

## 2020-06-02 PROCEDURE — 86900 BLOOD TYPING SEROLOGIC ABO: CPT

## 2020-06-02 PROCEDURE — 80048 BASIC METABOLIC PNL TOTAL CA: CPT

## 2020-06-02 PROCEDURE — 96376 TX/PRO/DX INJ SAME DRUG ADON: CPT

## 2020-06-02 PROCEDURE — 85730 THROMBOPLASTIN TIME PARTIAL: CPT

## 2020-06-02 PROCEDURE — 87521 HEPATITIS C PROBE&RVRS TRNSC: CPT

## 2020-06-02 PROCEDURE — 93005 ELECTROCARDIOGRAM TRACING: CPT

## 2020-06-02 PROCEDURE — 86901 BLOOD TYPING SEROLOGIC RH(D): CPT

## 2020-06-02 PROCEDURE — 81001 URINALYSIS AUTO W/SCOPE: CPT

## 2020-06-02 PROCEDURE — 96374 THER/PROPH/DIAG INJ IV PUSH: CPT | Mod: XU

## 2020-06-02 PROCEDURE — 94640 AIRWAY INHALATION TREATMENT: CPT

## 2020-06-02 PROCEDURE — 82803 BLOOD GASES ANY COMBINATION: CPT

## 2020-06-02 PROCEDURE — 74240 X-RAY XM UPR GI TRC 1CNTRST: CPT

## 2020-06-02 PROCEDURE — 86677 HELICOBACTER PYLORI ANTIBODY: CPT

## 2020-08-31 ENCOUNTER — APPOINTMENT (OUTPATIENT)
Dept: PULMONOLOGY | Facility: CLINIC | Age: 60
End: 2020-08-31
Payer: MEDICARE

## 2020-08-31 VITALS
TEMPERATURE: 99.4 F | DIASTOLIC BLOOD PRESSURE: 80 MMHG | HEART RATE: 136 BPM | RESPIRATION RATE: 16 BRPM | OXYGEN SATURATION: 100 % | SYSTOLIC BLOOD PRESSURE: 100 MMHG

## 2020-08-31 DIAGNOSIS — R06.02 SHORTNESS OF BREATH: ICD-10-CM

## 2020-08-31 DIAGNOSIS — J96.11 CHRONIC RESPIRATORY FAILURE WITH HYPOXIA: ICD-10-CM

## 2020-08-31 DIAGNOSIS — Z99.81 CHRONIC RESPIRATORY FAILURE WITH HYPOXIA: ICD-10-CM

## 2020-08-31 PROCEDURE — 99215 OFFICE O/P EST HI 40 MIN: CPT

## 2020-08-31 RX ORDER — OMEPRAZOLE 20 MG/1
20 CAPSULE, DELAYED RELEASE ORAL
Refills: 0 | Status: ACTIVE | COMMUNITY

## 2020-08-31 RX ORDER — ACETAMINOPHEN AND CODEINE 300; 30 MG/1; MG/1
300-30 TABLET ORAL
Qty: 15 | Refills: 0 | Status: ACTIVE | COMMUNITY
Start: 2020-04-16

## 2020-08-31 RX ORDER — OXYCODONE HYDROCHLORIDE 5 MG/1
5 CAPSULE ORAL
Qty: 4 | Refills: 0 | Status: ACTIVE | COMMUNITY
Start: 2020-04-15

## 2020-08-31 RX ORDER — ESCITALOPRAM OXALATE 5 MG/1
5 TABLET, FILM COATED ORAL
Refills: 0 | Status: ACTIVE | COMMUNITY

## 2020-08-31 RX ORDER — PREDNISONE 50 MG/1
50 TABLET ORAL
Qty: 5 | Refills: 0 | Status: COMPLETED | COMMUNITY
Start: 2020-04-15

## 2020-08-31 RX ORDER — AMLODIPINE BESYLATE 5 MG/1
5 TABLET ORAL
Qty: 90 | Refills: 0 | Status: COMPLETED | COMMUNITY
Start: 2020-03-24

## 2020-08-31 RX ORDER — LEVOTHYROXINE SODIUM 0.1 MG/1
100 TABLET ORAL
Qty: 30 | Refills: 0 | Status: ACTIVE | COMMUNITY
Start: 2020-03-12

## 2020-08-31 RX ORDER — MESALAMINE 800 MG/1
800 TABLET, DELAYED RELEASE ORAL
Qty: 60 | Refills: 0 | Status: COMPLETED | COMMUNITY
Start: 2020-03-12

## 2020-08-31 RX ORDER — LORAZEPAM 1 MG/1
1 TABLET ORAL
Qty: 21 | Refills: 0 | Status: ACTIVE | COMMUNITY
Start: 2020-03-18

## 2020-08-31 RX ORDER — ALBUTEROL SULFATE 2.5 MG/3ML
(2.5 MG/3ML) SOLUTION RESPIRATORY (INHALATION) EVERY 6 HOURS
Qty: 2 | Refills: 5 | Status: DISCONTINUED | COMMUNITY
Start: 2017-07-20 | End: 2020-08-31

## 2020-08-31 RX ORDER — METFORMIN HYDROCHLORIDE 500 MG/1
500 TABLET, COATED ORAL
Qty: 60 | Refills: 0 | Status: COMPLETED | COMMUNITY
Start: 2020-03-12

## 2020-08-31 NOTE — DISCUSSION/SUMMARY
[FreeTextEntry1] : She is a 60 year old woman, a former smoker (stopped 2017), with a history of hypertension, hypothyroidism, depression and severe oxygen dependent COPD. She has a DNR order. \par \par Has been on several therapies for her COPD. She feels that she is at her best when she is on prednisone \par \par To continue with Incruse, montelukast and albuterol as needed. Stay on oxygen 24 hours per day. \par \par Will given short burst of prednisone then resume prednisone 10 mg per day. \par \par Follow up in one month. \par \par

## 2020-08-31 NOTE — REVIEW OF SYSTEMS
[Cough] : cough [Thyroid Problem] : thyroid problem [Dyspnea] : dyspnea [Fever] : no fever [Poor Appetite] : normal appetite  [Postnasal Drip] : no postnasal drip [Sputum] : not coughing up ~M sputum [Hemoptysis] : no hemoptysis [Chest Tightness] : no chest tightness [PND] : no PND [Palpitations] : no palpitations [Edema] : ~T edema was not present [Heartburn] : no heartburn [Reflux] : no reflux [Constipation] : no constipation [Diarrhea] : no diarrhea [Dysuria] : no dysuria [Myalgias] : no myalgias [Rash] : no [unfilled] rash [Headache] : no headache [Diabetes] : no diabetes mellitus [DVT] : no DVT [Difficulty Maintaining Sleep] : no difficulty maintaining sleep [Snoring] : no snoring

## 2020-08-31 NOTE — PHYSICAL EXAM
[General Appearance - In No Acute Distress] : no acute distress [Normal Oropharynx] : normal oropharynx [Heart Sounds] : normal S1 and S2 [Murmurs] : no murmurs present [Respiration, Rhythm And Depth] : normal respiratory rhythm and effort [Auscultation Breath Sounds / Voice Sounds] : lungs were clear to auscultation bilaterally [Bowel Sounds] : normal bowel sounds [Abdomen Soft] : soft [Abnormal Walk] : normal gait [Skin Turgor] : normal skin turgor [Cyanosis, Localized] : no localized cyanosis [] : no rash [No Focal Deficits] : no focal deficits [Oriented To Time, Place, And Person] : oriented to person, place, and time [Neck Cervical Mass (___cm)] : no neck mass was observed [Erythema] : no erythema of the pharynx

## 2020-08-31 NOTE — HISTORY OF PRESENT ILLNESS
[Former] : former [YearQuit] : 2017 [FreeTextEntry1] : She is back from [place] and lives in [place] again. \par \par She now resides in [facility]. She is on Incruse and Combivent Respimat. Also on oxygen 24 hours per day. Complains of shortness of breath. Dry cough. No fever, chills or sweats. No sick contacts.

## 2020-09-28 ENCOUNTER — APPOINTMENT (OUTPATIENT)
Dept: PULMONOLOGY | Facility: CLINIC | Age: 60
End: 2020-09-28

## 2020-10-07 ENCOUNTER — APPOINTMENT (OUTPATIENT)
Dept: PULMONOLOGY | Facility: CLINIC | Age: 60
End: 2020-10-07
Payer: MEDICARE

## 2020-10-07 VITALS
SYSTOLIC BLOOD PRESSURE: 170 MMHG | HEART RATE: 128 BPM | DIASTOLIC BLOOD PRESSURE: 100 MMHG | WEIGHT: 126 LBS | OXYGEN SATURATION: 95 % | TEMPERATURE: 99.2 F | BODY MASS INDEX: 19.73 KG/M2

## 2020-10-07 DIAGNOSIS — J44.9 CHRONIC OBSTRUCTIVE PULMONARY DISEASE, UNSPECIFIED: ICD-10-CM

## 2020-10-07 PROCEDURE — 99214 OFFICE O/P EST MOD 30 MIN: CPT | Mod: 25

## 2020-10-07 PROCEDURE — 99406 BEHAV CHNG SMOKING 3-10 MIN: CPT

## 2020-10-07 RX ORDER — MONTELUKAST 10 MG/1
10 TABLET, FILM COATED ORAL
Refills: 0 | Status: COMPLETED | COMMUNITY
End: 2020-10-07

## 2020-10-07 RX ORDER — UMECLIDINIUM 62.5 UG/1
62.5 AEROSOL, POWDER ORAL
Refills: 0 | Status: COMPLETED | COMMUNITY
End: 2020-10-07

## 2020-10-07 NOTE — DISCUSSION/SUMMARY
[FreeTextEntry1] : She is a 60 year old woman, smoker,with a history of hypertension, hypothyroidism, depression and severe oxygen dependent COPD. She has a DNR order. \par \par Feels best when she is on prednisone she said. Will try and get her portable oxygen. To continue with oxygen 24 hours per day. Anoro added. Consider LDCT, \par \par Smoking cessation discussed with patient. Wants Chantix. Used it in the past without a problem. \par \par Follow up in one month.

## 2020-10-07 NOTE — COUNSELING
[Tobacco Use Cessation Intermediate Greater Than 3 Minutes Up to 10 Minutes] : Tobacco Use Cessation Intermediate Greater Than 3 Minutes Up to 10 Minutes [FreeTextEntry1] : 4

## 2020-10-07 NOTE — REVIEW OF SYSTEMS
[Cough] : cough [Dyspnea] : dyspnea [Thyroid Problem] : thyroid problem [Fever] : no fever [Chills] : no chills [Poor Appetite] : normal appetite  [Postnasal Drip] : no postnasal drip [Sputum] : not coughing up ~M sputum [Hemoptysis] : no hemoptysis [Chest Tightness] : no chest tightness [Pleuritic Pain] : no pleuritic pain [PND] : no PND [Palpitations] : no palpitations [Edema] : ~T edema was not present [Heartburn] : no heartburn [Reflux] : no reflux [Constipation] : no constipation [Diarrhea] : no diarrhea [Dysuria] : no dysuria [Myalgias] : no myalgias [Rash] : no [unfilled] rash [Headache] : no headache [Diabetes] : no diabetes mellitus [DVT] : no DVT [Difficulty Maintaining Sleep] : no difficulty maintaining sleep [Snoring] : no snoring

## 2020-10-07 NOTE — PHYSICAL EXAM
[General Appearance - In No Acute Distress] : no acute distress [Neck Cervical Mass (___cm)] : no neck mass was observed [Heart Sounds] : normal S1 and S2 [Murmurs] : no murmurs present [Respiration, Rhythm And Depth] : normal respiratory rhythm and effort [Auscultation Breath Sounds / Voice Sounds] : lungs were clear to auscultation bilaterally [Bowel Sounds] : normal bowel sounds [Abdomen Soft] : soft [Abnormal Walk] : normal gait [Cyanosis, Localized] : no localized cyanosis [Skin Turgor] : normal skin turgor [] : no rash [No Focal Deficits] : no focal deficits [Oriented To Time, Place, And Person] : oriented to person, place, and time [Normal Oropharynx] : abnormal oropharynx

## 2020-10-07 NOTE — HISTORY OF PRESENT ILLNESS
[Former] : former [TextBox_4] : She was in the hospital last week for COPD. Feels better. \par \par Lives in assisted living. \par \par She is SOB without prednisone. Needs the prednisone she says. \par \par Smoking. Wants Chantix. \par \par Follow up in three months.  [YearQuit] : 2017

## 2020-10-11 RX ORDER — PREDNISONE 10 MG/1
10 TABLET ORAL
Qty: 30 | Refills: 1 | Status: DISCONTINUED | COMMUNITY
Start: 2017-07-20 | End: 2020-10-11

## 2020-10-11 RX ORDER — VARENICLINE TARTRATE 0.5 (11)-1
0.5 MG X 11 & KIT ORAL
Qty: 1 | Refills: 0 | Status: ACTIVE | COMMUNITY
Start: 2020-10-07 | End: 1900-01-01

## 2020-10-11 RX ORDER — PREDNISONE 10 MG/1
10 TABLET ORAL
Qty: 30 | Refills: 0 | Status: DISCONTINUED | COMMUNITY
Start: 2020-08-31 | End: 2020-10-11

## 2020-10-11 RX ORDER — PREDNISONE 20 MG/1
20 TABLET ORAL DAILY
Qty: 5 | Refills: 1 | Status: DISCONTINUED | COMMUNITY
Start: 2020-08-31 | End: 2020-10-11

## 2020-11-04 ENCOUNTER — APPOINTMENT (OUTPATIENT)
Dept: PULMONOLOGY | Facility: CLINIC | Age: 60
End: 2020-11-04

## 2021-01-06 RX ORDER — PREDNISONE 20 MG/1
20 TABLET ORAL DAILY
Qty: 30 | Refills: 0 | Status: ACTIVE | COMMUNITY
Start: 2020-10-07 | End: 1900-01-01

## 2022-06-25 NOTE — ED ADULT TRIAGE NOTE - ARRIVAL FROM
Home A-T Advancement Flap Text: The defect edges were debeveled with a #15 scalpel blade.  Given the location of the defect, shape of the defect and the proximity to free margins an A-T advancement flap was deemed most appropriate.  Using a sterile surgical marker, an appropriate advancement flap was drawn incorporating the defect and placing the expected incisions within the relaxed skin tension lines where possible.    The area thus outlined was incised deep to adipose tissue with a #15 scalpel blade.  The skin margins were undermined to an appropriate distance in all directions utilizing iris scissors.

## 2023-01-13 NOTE — PROVIDER CONTACT NOTE (OTHER) - SITUATION
[Time Spent: ___ minutes] : I have spent [unfilled] minutes of time on the encounter. Pt agitated, screaming and cursing at staff. Pt threatening to "throw herself out the bed" and pull out IV Pt agitated, screaming and cursing at staff. Pt threatening to "throw herself out the bed" and pull out IV. Security called and currently at bedside with pt